# Patient Record
Sex: MALE | Race: WHITE | NOT HISPANIC OR LATINO | Employment: FULL TIME | URBAN - NONMETROPOLITAN AREA
[De-identification: names, ages, dates, MRNs, and addresses within clinical notes are randomized per-mention and may not be internally consistent; named-entity substitution may affect disease eponyms.]

---

## 2020-10-07 ENCOUNTER — HOSPITAL ENCOUNTER (EMERGENCY)
Facility: HOSPITAL | Age: 40
Discharge: HOME | End: 2020-10-07
Attending: EMERGENCY MEDICINE
Payer: MEDICAID

## 2020-10-07 VITALS
WEIGHT: 195.63 LBS | SYSTOLIC BLOOD PRESSURE: 149 MMHG | HEART RATE: 91 BPM | OXYGEN SATURATION: 99 % | HEIGHT: 72 IN | DIASTOLIC BLOOD PRESSURE: 73 MMHG | RESPIRATION RATE: 16 BRPM | TEMPERATURE: 99 F | BODY MASS INDEX: 26.5 KG/M2

## 2020-10-07 DIAGNOSIS — R07.9 CHEST PAIN: ICD-10-CM

## 2020-10-07 LAB
ALBUMIN SERPL BCP-MCNC: 3.2 G/DL (ref 3.5–5.2)
ALP SERPL-CCNC: 36 U/L (ref 55–135)
ALT SERPL W/O P-5'-P-CCNC: 258 U/L (ref 10–44)
ANION GAP SERPL CALC-SCNC: 4 MMOL/L (ref 8–16)
AST SERPL-CCNC: 73 U/L (ref 10–40)
BASOPHILS # BLD AUTO: 0.11 K/UL (ref 0–0.2)
BASOPHILS NFR BLD: 1.4 % (ref 0–1.9)
BILIRUB SERPL-MCNC: 0.2 MG/DL (ref 0.1–1)
BUN SERPL-MCNC: 14 MG/DL (ref 6–20)
CALCIUM SERPL-MCNC: 8.6 MG/DL (ref 8.7–10.5)
CHLORIDE SERPL-SCNC: 105 MMOL/L (ref 95–110)
CO2 SERPL-SCNC: 29 MMOL/L (ref 23–29)
CREAT SERPL-MCNC: 1.1 MG/DL (ref 0.5–1.4)
DIFFERENTIAL METHOD: ABNORMAL
EOSINOPHIL # BLD AUTO: 0.5 K/UL (ref 0–0.5)
EOSINOPHIL NFR BLD: 6.7 % (ref 0–8)
ERYTHROCYTE [DISTWIDTH] IN BLOOD BY AUTOMATED COUNT: 13.8 % (ref 11.5–14.5)
EST. GFR  (AFRICAN AMERICAN): >60 ML/MIN/1.73 M^2
EST. GFR  (NON AFRICAN AMERICAN): >60 ML/MIN/1.73 M^2
GLUCOSE SERPL-MCNC: 110 MG/DL (ref 70–110)
HCT VFR BLD AUTO: 41.9 % (ref 40–54)
HGB BLD-MCNC: 14.1 G/DL (ref 14–18)
IMM GRANULOCYTES # BLD AUTO: 0.03 K/UL (ref 0–0.04)
IMM GRANULOCYTES NFR BLD AUTO: 0.4 % (ref 0–0.5)
LYMPHOCYTES # BLD AUTO: 1.6 K/UL (ref 1–4.8)
LYMPHOCYTES NFR BLD: 20.3 % (ref 18–48)
MCH RBC QN AUTO: 30.7 PG (ref 27–31)
MCHC RBC AUTO-ENTMCNC: 33.7 G/DL (ref 32–36)
MCV RBC AUTO: 91 FL (ref 82–98)
MONOCYTES # BLD AUTO: 0.8 K/UL (ref 0.3–1)
MONOCYTES NFR BLD: 10.6 % (ref 4–15)
NEUTROPHILS # BLD AUTO: 4.8 K/UL (ref 1.8–7.7)
NEUTROPHILS NFR BLD: 60.6 % (ref 38–73)
NRBC BLD-RTO: 0 /100 WBC
PLATELET # BLD AUTO: 294 K/UL (ref 150–350)
PMV BLD AUTO: 11.1 FL (ref 9.2–12.9)
POTASSIUM SERPL-SCNC: 3.8 MMOL/L (ref 3.5–5.1)
PROT SERPL-MCNC: 6.7 G/DL (ref 6–8.4)
RBC # BLD AUTO: 4.59 M/UL (ref 4.6–6.2)
SODIUM SERPL-SCNC: 138 MMOL/L (ref 136–145)
TROPONIN I SERPL DL<=0.01 NG/ML-MCNC: <0.02 NG/ML (ref 0–0.03)
WBC # BLD AUTO: 7.93 K/UL (ref 3.9–12.7)

## 2020-10-07 PROCEDURE — 84484 ASSAY OF TROPONIN QUANT: CPT

## 2020-10-07 PROCEDURE — 99285 EMERGENCY DEPT VISIT HI MDM: CPT | Mod: 25

## 2020-10-07 PROCEDURE — 93010 EKG 12-LEAD: ICD-10-PCS | Mod: ,,, | Performed by: INTERNAL MEDICINE

## 2020-10-07 PROCEDURE — 93010 ELECTROCARDIOGRAM REPORT: CPT | Mod: ,,, | Performed by: INTERNAL MEDICINE

## 2020-10-07 PROCEDURE — 36415 COLL VENOUS BLD VENIPUNCTURE: CPT

## 2020-10-07 PROCEDURE — 93005 ELECTROCARDIOGRAM TRACING: CPT

## 2020-10-07 PROCEDURE — 80053 COMPREHEN METABOLIC PANEL: CPT

## 2020-10-07 PROCEDURE — 85025 COMPLETE CBC W/AUTO DIFF WBC: CPT

## 2020-10-07 RX ORDER — ALPRAZOLAM 0.5 MG/1
0.5 TABLET ORAL NIGHTLY PRN
COMMUNITY

## 2020-10-07 RX ORDER — TESTOSTERONE ENANTHATE 200 MG/ML
VIAL (ML) INTRAMUSCULAR
COMMUNITY

## 2020-10-08 NOTE — ED PROVIDER NOTES
"Encounter Date: 10/7/2020       History     Chief Complaint   Patient presents with    Palpitations     "I feel like my heart is sore. It hurts with every beat"     41 yo male with history of PTSD/anxiety here with complaints of left chest pain, decreased exercise tolerance. Gradual onset x 4 months. Worse with exertion and improved with rest. No fever. No known sick contacts.         Review of patient's allergies indicates:  No Known Allergies  History reviewed. No pertinent past medical history.  History reviewed. No pertinent surgical history.  History reviewed. No pertinent family history.  Social History     Tobacco Use    Smoking status: Never Smoker   Substance Use Topics    Alcohol use: Not on file    Drug use: Yes     Types: Marijuana     Review of Systems   Constitutional: Negative.    Respiratory: Negative.    Cardiovascular: Positive for chest pain.   All other systems reviewed and are negative.      Physical Exam     Initial Vitals [10/07/20 2111]   BP Pulse Resp Temp SpO2   (!) 149/73 96 16 98.9 °F (37.2 °C) 97 %      MAP       --         Physical Exam    Nursing note and vitals reviewed.  Constitutional: He appears well-developed and well-nourished. He is not diaphoretic. No distress.   HENT:   Head: Normocephalic and atraumatic.   Eyes: EOM are normal. Pupils are equal, round, and reactive to light.   Neck: Normal range of motion. Neck supple.   Cardiovascular: Normal rate, regular rhythm, normal heart sounds and intact distal pulses.   Pulmonary/Chest: Breath sounds normal. No respiratory distress. He has no wheezes. He has no rales.   Abdominal: Soft. Bowel sounds are normal. He exhibits no distension. There is no abdominal tenderness. There is no rebound.   Musculoskeletal: Normal range of motion. No tenderness or edema.   Neurological: He is alert and oriented to person, place, and time.   Skin: Skin is warm and dry. Capillary refill takes less than 2 seconds.   Psychiatric: He has a normal " mood and affect. Thought content normal.         ED Course   Procedures  Labs Reviewed   CBC W/ AUTO DIFFERENTIAL - Abnormal; Notable for the following components:       Result Value    RBC 4.59 (*)     All other components within normal limits   COMPREHENSIVE METABOLIC PANEL - Abnormal; Notable for the following components:    Calcium 8.6 (*)     Albumin 3.2 (*)     Alkaline Phosphatase 36 (*)     AST 73 (*)      (*)     Anion Gap 4 (*)     All other components within normal limits   TROPONIN I     EKG Readings: (Independently Interpreted)   Initial Reading: No STEMI. Rhythm: Normal Sinus Rhythm. Heart Rate: 83. Ectopy: No Ectopy. Clinical Impression: Normal Sinus Rhythm       Imaging Results          X-Ray Chest AP Portable (In process)               X-Rays:   Independently Interpreted Readings:   Chest X-Ray: No acute abnormalities.     Medical Decision Making:   Clinical Tests:   Lab Tests: Reviewed and Ordered  Radiological Study: Reviewed and Ordered  Medical Tests: Reviewed and Ordered    Additional MDM:   Heart Score:    History:          Slightly suspicious.  ECG:             Normal  Age:               Less than 45 years  Risk factors: no risk factors known  Troponin:       Less than or equal to normal limit  Final Score: 0                              Clinical Impression:       ICD-10-CM ICD-9-CM   1. Chest pain  R07.9 786.50   2. Chest pain  R07.9 786.50                      Disposition:   Disposition: Discharged  Condition: Stable     ED Disposition Condition    Discharge Stable        ED Prescriptions     None        Follow-up Information     Follow up With Specialties Details Why Contact Info    Oli Hendrickson MD Internal Medicine Schedule an appointment as soon as possible for a visit   [address] [place] [address]  [phone]      Alexys Barreto MD Cardiology Schedule an appointment as soon as possible for a visit   [address] [place] [address]  [facility] [address]  [phone]                                         Jarvis Talavera MD  10/07/20 2210

## 2020-10-08 NOTE — ED NOTES
"NEUROLOGICAL:   Patient is awake , alert  and oriented x 4 . Pupils are PERRL. Gait is steady.   Moves all extremities without difficulty.   Patient reports no neuro complaints..  GCS 15    HEENT:   Head appears normocephalic  and symmetric .   Eyes appear WNL to both eyes. Patient reports no complaints  to both eyes .   Ears appear WNL. Patient reports no complaints  to both ears.   Nares appear patent . Patient reports no nose complaints .  Mouth appears moist, pink and teeth intact. Patient reports no mouth complaints.   Throat appears pink and moist . Patient reports no throat complaints.    CARDIOVASCULAR:   S1 and S2 present, no murmurs, gallops, or rubs, rate regular  and pulses palpable (2+)    On palpation no edema noted , noted to none.   Patient reports "I feel like my heart is sore." Pt rates pain 5-6/10 and reports no radiation.  Patient vitals are WNL.    RESPIRATORY:   Airway Clear, Open, and Patent.  Respirations are even and unlabored.   Breath sounds clear  to all lung fields.   Patient reports no respiratory complaints.     GASTROINTESTINAL:   Abdomen is soft  and non-tender x 4 quadrants. Bowel sounds are normoactive to all quadrants .   Patient reports no GI complaints .     GENITOURINARY:   Patient reports no  complaints.     MUSCULOSKELETAL:   full range of motion to all extremities, no swelling noted , no tenderness noted and no weakness noted.   Patient reports no musculoskeletal complaints     SKIN:   Skin appears warm , dry , good turgor, color normal for race and intact. Patient reports no skin complaints.     "

## 2021-04-01 ENCOUNTER — LAB VISIT (OUTPATIENT)
Dept: LAB | Facility: HOSPITAL | Age: 41
End: 2021-04-01
Attending: INTERNAL MEDICINE
Payer: MEDICAID

## 2021-04-01 DIAGNOSIS — Z13.9 SCREENING FOR UNSPECIFIED CONDITION: Primary | ICD-10-CM

## 2021-04-01 DIAGNOSIS — F41.1 GENERALIZED ANXIETY DISORDER: ICD-10-CM

## 2021-04-01 DIAGNOSIS — F52.21 ERECTILE DYSFUNCTION OF NONORGANIC ORIGIN: ICD-10-CM

## 2021-04-01 LAB
ALBUMIN SERPL BCP-MCNC: 3.8 G/DL (ref 3.5–5.2)
ALP SERPL-CCNC: 48 U/L (ref 55–135)
ALT SERPL W/O P-5'-P-CCNC: 57 U/L (ref 10–44)
AMPHET+METHAMPHET UR QL: NEGATIVE
ANION GAP SERPL CALC-SCNC: 5 MMOL/L (ref 8–16)
AST SERPL-CCNC: 29 U/L (ref 10–40)
BARBITURATES UR QL SCN>200 NG/ML: NORMAL
BASOPHILS # BLD AUTO: 0.11 K/UL (ref 0–0.2)
BASOPHILS NFR BLD: 1.2 % (ref 0–1.9)
BENZODIAZ UR QL SCN>200 NG/ML: NEGATIVE
BILIRUB SERPL-MCNC: 0.2 MG/DL (ref 0.1–1)
BUN SERPL-MCNC: 12 MG/DL (ref 6–20)
BZE UR QL SCN: NEGATIVE
CALCIUM SERPL-MCNC: 8.7 MG/DL (ref 8.7–10.5)
CANNABINOIDS UR QL SCN: NEGATIVE
CHLORIDE SERPL-SCNC: 106 MMOL/L (ref 95–110)
CO2 SERPL-SCNC: 29 MMOL/L (ref 23–29)
COMPLEXED PSA SERPL-MCNC: 0.59 NG/ML (ref 0–4)
CREAT SERPL-MCNC: 1.1 MG/DL (ref 0.5–1.4)
CREAT UR-MCNC: 98.6 MG/DL (ref 23–375)
DIFFERENTIAL METHOD: ABNORMAL
EOSINOPHIL # BLD AUTO: 0.9 K/UL (ref 0–0.5)
EOSINOPHIL NFR BLD: 10 % (ref 0–8)
ERYTHROCYTE [DISTWIDTH] IN BLOOD BY AUTOMATED COUNT: 13.3 % (ref 11.5–14.5)
EST. GFR  (AFRICAN AMERICAN): >60 ML/MIN/1.73 M^2
EST. GFR  (NON AFRICAN AMERICAN): >60 ML/MIN/1.73 M^2
GLUCOSE SERPL-MCNC: 131 MG/DL (ref 70–110)
HCT VFR BLD AUTO: 45.8 % (ref 40–54)
HGB BLD-MCNC: 15.2 G/DL (ref 14–18)
IMM GRANULOCYTES # BLD AUTO: 0.03 K/UL (ref 0–0.04)
IMM GRANULOCYTES NFR BLD AUTO: 0.3 % (ref 0–0.5)
LYMPHOCYTES # BLD AUTO: 1.6 K/UL (ref 1–4.8)
LYMPHOCYTES NFR BLD: 17.8 % (ref 18–48)
MCH RBC QN AUTO: 30.3 PG (ref 27–31)
MCHC RBC AUTO-ENTMCNC: 33.2 G/DL (ref 32–36)
MCV RBC AUTO: 91 FL (ref 82–98)
METHADONE UR QL SCN>300 NG/ML: NEGATIVE
MONOCYTES # BLD AUTO: 0.6 K/UL (ref 0.3–1)
MONOCYTES NFR BLD: 7 % (ref 4–15)
NEUTROPHILS # BLD AUTO: 5.8 K/UL (ref 1.8–7.7)
NEUTROPHILS NFR BLD: 63.7 % (ref 38–73)
NRBC BLD-RTO: 0 /100 WBC
OPIATES UR QL SCN: NEGATIVE
PCP UR QL SCN>25 NG/ML: NEGATIVE
PLATELET # BLD AUTO: 325 K/UL (ref 150–450)
PMV BLD AUTO: 10.8 FL (ref 9.2–12.9)
POTASSIUM SERPL-SCNC: 3.7 MMOL/L (ref 3.5–5.1)
PROT SERPL-MCNC: 7.5 G/DL (ref 6–8.4)
RBC # BLD AUTO: 5.01 M/UL (ref 4.6–6.2)
SODIUM SERPL-SCNC: 140 MMOL/L (ref 136–145)
TOXICOLOGY INFORMATION: NORMAL
TSH SERPL DL<=0.005 MIU/L-ACNC: 2.07 UIU/ML (ref 0.4–4)
WBC # BLD AUTO: 9.12 K/UL (ref 3.9–12.7)

## 2021-04-01 PROCEDURE — 85025 COMPLETE CBC W/AUTO DIFF WBC: CPT | Performed by: INTERNAL MEDICINE

## 2021-04-01 PROCEDURE — 84402 ASSAY OF FREE TESTOSTERONE: CPT | Performed by: INTERNAL MEDICINE

## 2021-04-01 PROCEDURE — 84153 ASSAY OF PSA TOTAL: CPT | Performed by: INTERNAL MEDICINE

## 2021-04-01 PROCEDURE — 86769 SARS-COV-2 COVID-19 ANTIBODY: CPT | Performed by: INTERNAL MEDICINE

## 2021-04-01 PROCEDURE — 84443 ASSAY THYROID STIM HORMONE: CPT | Performed by: INTERNAL MEDICINE

## 2021-04-01 PROCEDURE — 80307 DRUG TEST PRSMV CHEM ANLYZR: CPT | Performed by: INTERNAL MEDICINE

## 2021-04-01 PROCEDURE — 80053 COMPREHEN METABOLIC PANEL: CPT | Performed by: INTERNAL MEDICINE

## 2021-04-01 PROCEDURE — 36415 COLL VENOUS BLD VENIPUNCTURE: CPT | Performed by: INTERNAL MEDICINE

## 2021-04-02 LAB — SARS-COV-2 IGG SERPLBLD QL IA.RAPID: NEGATIVE

## 2021-04-06 LAB — TESTOST FREE SERPL-MCNC: 25.1 PG/ML (ref 5.1–41.5)

## 2021-05-22 ENCOUNTER — HOSPITAL ENCOUNTER (EMERGENCY)
Facility: HOSPITAL | Age: 41
Discharge: HOME | End: 2021-05-22
Attending: EMERGENCY MEDICINE
Payer: MEDICAID

## 2021-05-22 VITALS
HEART RATE: 97 BPM | SYSTOLIC BLOOD PRESSURE: 137 MMHG | WEIGHT: 187 LBS | DIASTOLIC BLOOD PRESSURE: 94 MMHG | TEMPERATURE: 99 F | HEIGHT: 72 IN | OXYGEN SATURATION: 97 % | BODY MASS INDEX: 25.33 KG/M2 | RESPIRATION RATE: 16 BRPM

## 2021-05-22 DIAGNOSIS — K12.2 UVULITIS: Primary | ICD-10-CM

## 2021-05-22 LAB
ALBUMIN SERPL BCP-MCNC: 3.3 G/DL (ref 3.5–5.2)
ALP SERPL-CCNC: 42 U/L (ref 55–135)
ALT SERPL W/O P-5'-P-CCNC: 54 U/L (ref 10–44)
ANION GAP SERPL CALC-SCNC: 5 MMOL/L (ref 8–16)
AST SERPL-CCNC: 24 U/L (ref 10–40)
BASOPHILS # BLD AUTO: 0.15 K/UL (ref 0–0.2)
BASOPHILS NFR BLD: 1.9 % (ref 0–1.9)
BILIRUB SERPL-MCNC: 0.3 MG/DL (ref 0.1–1)
BUN SERPL-MCNC: 12 MG/DL (ref 6–20)
CALCIUM SERPL-MCNC: 9.2 MG/DL (ref 8.7–10.5)
CHLORIDE SERPL-SCNC: 110 MMOL/L (ref 95–110)
CO2 SERPL-SCNC: 28 MMOL/L (ref 23–29)
CREAT SERPL-MCNC: 1.1 MG/DL (ref 0.5–1.4)
CTP QC/QA: YES
DIFFERENTIAL METHOD: ABNORMAL
EOSINOPHIL # BLD AUTO: 0.8 K/UL (ref 0–0.5)
EOSINOPHIL NFR BLD: 9.6 % (ref 0–8)
ERYTHROCYTE [DISTWIDTH] IN BLOOD BY AUTOMATED COUNT: 13.5 % (ref 11.5–14.5)
EST. GFR  (AFRICAN AMERICAN): >60 ML/MIN/1.73 M^2
EST. GFR  (NON AFRICAN AMERICAN): >60 ML/MIN/1.73 M^2
GLUCOSE SERPL-MCNC: 99 MG/DL (ref 70–110)
HCT VFR BLD AUTO: 45.2 % (ref 40–54)
HGB BLD-MCNC: 15 G/DL (ref 14–18)
IMM GRANULOCYTES # BLD AUTO: 0.04 K/UL (ref 0–0.04)
IMM GRANULOCYTES NFR BLD AUTO: 0.5 % (ref 0–0.5)
LYMPHOCYTES # BLD AUTO: 1.1 K/UL (ref 1–4.8)
LYMPHOCYTES NFR BLD: 14 % (ref 18–48)
MCH RBC QN AUTO: 30.3 PG (ref 27–31)
MCHC RBC AUTO-ENTMCNC: 33.2 G/DL (ref 32–36)
MCV RBC AUTO: 91 FL (ref 82–98)
MONOCYTES # BLD AUTO: 0.8 K/UL (ref 0.3–1)
MONOCYTES NFR BLD: 9.9 % (ref 4–15)
NEUTROPHILS # BLD AUTO: 5.2 K/UL (ref 1.8–7.7)
NEUTROPHILS NFR BLD: 64.1 % (ref 38–73)
NRBC BLD-RTO: 0 /100 WBC
PLATELET # BLD AUTO: 285 K/UL (ref 150–450)
PMV BLD AUTO: 11.3 FL (ref 9.2–12.9)
POTASSIUM SERPL-SCNC: 4.3 MMOL/L (ref 3.5–5.1)
PROT SERPL-MCNC: 6.6 G/DL (ref 6–8.4)
RBC # BLD AUTO: 4.95 M/UL (ref 4.6–6.2)
SARS-COV-2 RDRP RESP QL NAA+PROBE: NEGATIVE
SODIUM SERPL-SCNC: 143 MMOL/L (ref 136–145)
WBC # BLD AUTO: 8.02 K/UL (ref 3.9–12.7)

## 2021-05-22 PROCEDURE — 25000003 PHARM REV CODE 250: Performed by: NURSE PRACTITIONER

## 2021-05-22 PROCEDURE — 36415 COLL VENOUS BLD VENIPUNCTURE: CPT | Performed by: NURSE PRACTITIONER

## 2021-05-22 PROCEDURE — 96372 THER/PROPH/DIAG INJ SC/IM: CPT

## 2021-05-22 PROCEDURE — 63600175 PHARM REV CODE 636 W HCPCS: Performed by: NURSE PRACTITIONER

## 2021-05-22 PROCEDURE — 99284 EMERGENCY DEPT VISIT MOD MDM: CPT | Mod: 25

## 2021-05-22 PROCEDURE — 85025 COMPLETE CBC W/AUTO DIFF WBC: CPT | Performed by: NURSE PRACTITIONER

## 2021-05-22 PROCEDURE — U0002 COVID-19 LAB TEST NON-CDC: HCPCS | Performed by: NURSE PRACTITIONER

## 2021-05-22 PROCEDURE — 80053 COMPREHEN METABOLIC PANEL: CPT | Performed by: NURSE PRACTITIONER

## 2021-05-22 RX ORDER — DEXAMETHASONE SODIUM PHOSPHATE 4 MG/ML
8 INJECTION, SOLUTION INTRA-ARTICULAR; INTRALESIONAL; INTRAMUSCULAR; INTRAVENOUS; SOFT TISSUE
Status: COMPLETED | OUTPATIENT
Start: 2021-05-22 | End: 2021-05-22

## 2021-05-22 RX ORDER — AMOXICILLIN AND CLAVULANATE POTASSIUM 875; 125 MG/1; MG/1
1 TABLET, FILM COATED ORAL 2 TIMES DAILY
Qty: 14 TABLET | Refills: 0 | Status: SHIPPED | OUTPATIENT
Start: 2021-05-22 | End: 2022-01-04 | Stop reason: ALTCHOICE

## 2021-05-22 RX ORDER — ONDANSETRON 4 MG/1
4 TABLET, ORALLY DISINTEGRATING ORAL
Status: COMPLETED | OUTPATIENT
Start: 2021-05-22 | End: 2021-05-22

## 2021-05-22 RX ADMIN — ONDANSETRON 4 MG: 4 TABLET, ORALLY DISINTEGRATING ORAL at 12:05

## 2021-05-22 RX ADMIN — DEXAMETHASONE SODIUM PHOSPHATE 8 MG: 4 INJECTION, SOLUTION INTRA-ARTICULAR; INTRALESIONAL; INTRAMUSCULAR; INTRAVENOUS; SOFT TISSUE at 01:05

## 2021-05-25 ENCOUNTER — HOSPITAL ENCOUNTER (EMERGENCY)
Facility: HOSPITAL | Age: 41
Discharge: HOME | End: 2021-05-25
Attending: EMERGENCY MEDICINE
Payer: MEDICAID

## 2021-05-25 VITALS
HEART RATE: 93 BPM | BODY MASS INDEX: 26.88 KG/M2 | TEMPERATURE: 99 F | HEIGHT: 71 IN | WEIGHT: 192 LBS | OXYGEN SATURATION: 97 % | DIASTOLIC BLOOD PRESSURE: 85 MMHG | RESPIRATION RATE: 16 BRPM | SYSTOLIC BLOOD PRESSURE: 137 MMHG

## 2021-05-25 DIAGNOSIS — Z20.822 COVID-19 VIRUS NOT DETECTED: Primary | ICD-10-CM

## 2021-05-25 LAB
CTP QC/QA: YES
SARS-COV-2 RDRP RESP QL NAA+PROBE: NEGATIVE

## 2021-05-25 PROCEDURE — 99282 EMERGENCY DEPT VISIT SF MDM: CPT

## 2021-05-25 PROCEDURE — U0002 COVID-19 LAB TEST NON-CDC: HCPCS | Performed by: EMERGENCY MEDICINE

## 2021-07-07 ENCOUNTER — HOSPITAL ENCOUNTER (EMERGENCY)
Facility: HOSPITAL | Age: 41
Discharge: HOME | End: 2021-07-07
Attending: EMERGENCY MEDICINE
Payer: MEDICAID

## 2021-07-07 VITALS
OXYGEN SATURATION: 99 % | HEART RATE: 108 BPM | DIASTOLIC BLOOD PRESSURE: 67 MMHG | HEIGHT: 71 IN | RESPIRATION RATE: 20 BRPM | WEIGHT: 186 LBS | TEMPERATURE: 99 F | SYSTOLIC BLOOD PRESSURE: 155 MMHG | BODY MASS INDEX: 26.04 KG/M2

## 2021-07-07 DIAGNOSIS — R06.00 DYSPNEA, UNSPECIFIED TYPE: Primary | ICD-10-CM

## 2021-07-07 DIAGNOSIS — F41.9 ANXIETY: ICD-10-CM

## 2021-07-07 DIAGNOSIS — R06.02 SOB (SHORTNESS OF BREATH): ICD-10-CM

## 2021-07-07 PROCEDURE — 99283 EMERGENCY DEPT VISIT LOW MDM: CPT | Mod: 25

## 2021-07-07 RX ORDER — TESTOSTERONE CYPIONATE 200 MG/ML
INJECTION, SOLUTION INTRAMUSCULAR
COMMUNITY
Start: 2021-03-21

## 2021-07-07 RX ORDER — BUPROPION HYDROCHLORIDE 300 MG/1
TABLET ORAL
COMMUNITY
Start: 2021-03-21

## 2021-08-10 ENCOUNTER — HOSPITAL ENCOUNTER (EMERGENCY)
Facility: HOSPITAL | Age: 41
Discharge: HOME | End: 2021-08-11
Attending: EMERGENCY MEDICINE
Payer: MEDICAID

## 2021-08-10 VITALS
BODY MASS INDEX: 26.6 KG/M2 | RESPIRATION RATE: 20 BRPM | TEMPERATURE: 98 F | SYSTOLIC BLOOD PRESSURE: 131 MMHG | WEIGHT: 190 LBS | HEIGHT: 71 IN | DIASTOLIC BLOOD PRESSURE: 90 MMHG | OXYGEN SATURATION: 98 % | HEART RATE: 103 BPM

## 2021-08-10 DIAGNOSIS — R51.9 ACUTE NONINTRACTABLE HEADACHE, UNSPECIFIED HEADACHE TYPE: Primary | ICD-10-CM

## 2021-08-10 LAB
CTP QC/QA: YES
SARS-COV-2 RDRP RESP QL NAA+PROBE: NEGATIVE

## 2021-08-10 PROCEDURE — 99282 EMERGENCY DEPT VISIT SF MDM: CPT

## 2021-08-10 PROCEDURE — U0002 COVID-19 LAB TEST NON-CDC: HCPCS | Performed by: NURSE PRACTITIONER

## 2021-08-18 ENCOUNTER — HOSPITAL ENCOUNTER (OUTPATIENT)
Dept: RADIOLOGY | Facility: HOSPITAL | Age: 41
Discharge: HOME | End: 2021-08-18
Attending: INTERNAL MEDICINE
Payer: MEDICAID

## 2021-08-18 DIAGNOSIS — J06.9 ACUTE UPPER RESPIRATORY INFECTION: ICD-10-CM

## 2021-08-18 DIAGNOSIS — J06.9 ACUTE UPPER RESPIRATORY INFECTION: Primary | ICD-10-CM

## 2021-08-18 PROCEDURE — 71046 X-RAY EXAM CHEST 2 VIEWS: CPT | Mod: TC

## 2021-10-21 ENCOUNTER — HOSPITAL ENCOUNTER (EMERGENCY)
Facility: HOSPITAL | Age: 41
Discharge: HOME | End: 2021-10-21
Attending: EMERGENCY MEDICINE
Payer: MEDICAID

## 2021-10-21 VITALS
HEART RATE: 110 BPM | RESPIRATION RATE: 18 BRPM | DIASTOLIC BLOOD PRESSURE: 78 MMHG | WEIGHT: 183 LBS | OXYGEN SATURATION: 96 % | TEMPERATURE: 98 F | BODY MASS INDEX: 25.52 KG/M2 | SYSTOLIC BLOOD PRESSURE: 128 MMHG

## 2021-10-21 DIAGNOSIS — R00.2 PALPITATION: ICD-10-CM

## 2021-10-21 DIAGNOSIS — F41.9 ANXIETY: Primary | ICD-10-CM

## 2021-10-21 PROCEDURE — 93010 EKG 12-LEAD: ICD-10-PCS | Mod: ,,, | Performed by: INTERNAL MEDICINE

## 2021-10-21 PROCEDURE — 93005 ELECTROCARDIOGRAM TRACING: CPT

## 2021-10-21 PROCEDURE — 25000003 PHARM REV CODE 250: Performed by: EMERGENCY MEDICINE

## 2021-10-21 PROCEDURE — 93010 ELECTROCARDIOGRAM REPORT: CPT | Mod: ,,, | Performed by: INTERNAL MEDICINE

## 2021-10-21 PROCEDURE — 99283 EMERGENCY DEPT VISIT LOW MDM: CPT | Mod: 25

## 2021-10-21 RX ORDER — METOPROLOL TARTRATE 25 MG/1
25 TABLET, FILM COATED ORAL
Status: COMPLETED | OUTPATIENT
Start: 2021-10-21 | End: 2021-10-21

## 2021-10-21 RX ADMIN — METOPROLOL TARTRATE 25 MG: 25 TABLET, FILM COATED ORAL at 07:10

## 2021-10-28 ENCOUNTER — HOSPITAL ENCOUNTER (EMERGENCY)
Facility: HOSPITAL | Age: 41
Discharge: HOME | End: 2021-10-28
Attending: EMERGENCY MEDICINE
Payer: MEDICAID

## 2021-10-28 VITALS
SYSTOLIC BLOOD PRESSURE: 128 MMHG | TEMPERATURE: 98 F | WEIGHT: 183 LBS | BODY MASS INDEX: 25.62 KG/M2 | HEART RATE: 90 BPM | HEIGHT: 71 IN | DIASTOLIC BLOOD PRESSURE: 78 MMHG | OXYGEN SATURATION: 98 % | RESPIRATION RATE: 16 BRPM

## 2021-10-28 DIAGNOSIS — R10.9 ABDOMINAL PAIN, UNSPECIFIED ABDOMINAL LOCATION: Primary | ICD-10-CM

## 2021-10-28 LAB
ALBUMIN SERPL BCP-MCNC: 3.1 G/DL (ref 3.5–5.2)
ALP SERPL-CCNC: 50 U/L (ref 55–135)
ALT SERPL W/O P-5'-P-CCNC: 82 U/L (ref 10–44)
ANION GAP SERPL CALC-SCNC: 5 MMOL/L (ref 8–16)
AST SERPL-CCNC: 33 U/L (ref 10–40)
BASOPHILS # BLD AUTO: 0.12 K/UL (ref 0–0.2)
BASOPHILS NFR BLD: 1.3 % (ref 0–1.9)
BILIRUB SERPL-MCNC: 0.2 MG/DL (ref 0.1–1)
BILIRUB UR QL STRIP: NEGATIVE
BUN SERPL-MCNC: 12 MG/DL (ref 6–20)
CALCIUM SERPL-MCNC: 8.1 MG/DL (ref 8.7–10.5)
CHLORIDE SERPL-SCNC: 108 MMOL/L (ref 95–110)
CLARITY UR: CLEAR
CO2 SERPL-SCNC: 26 MMOL/L (ref 23–29)
COLOR UR: YELLOW
CREAT SERPL-MCNC: 1.2 MG/DL (ref 0.5–1.4)
DIFFERENTIAL METHOD: ABNORMAL
EOSINOPHIL # BLD AUTO: 0.5 K/UL (ref 0–0.5)
EOSINOPHIL NFR BLD: 5.1 % (ref 0–8)
ERYTHROCYTE [DISTWIDTH] IN BLOOD BY AUTOMATED COUNT: 13.4 % (ref 11.5–14.5)
EST. GFR  (AFRICAN AMERICAN): >60 ML/MIN/1.73 M^2
EST. GFR  (NON AFRICAN AMERICAN): >60 ML/MIN/1.73 M^2
GLUCOSE SERPL-MCNC: 125 MG/DL (ref 70–110)
GLUCOSE UR QL STRIP: NEGATIVE
HCT VFR BLD AUTO: 46.1 % (ref 40–54)
HGB BLD-MCNC: 15.5 G/DL (ref 14–18)
HGB UR QL STRIP: NEGATIVE
IMM GRANULOCYTES # BLD AUTO: 0.06 K/UL (ref 0–0.04)
IMM GRANULOCYTES NFR BLD AUTO: 0.6 % (ref 0–0.5)
KETONES UR QL STRIP: ABNORMAL
LEUKOCYTE ESTERASE UR QL STRIP: NEGATIVE
LIPASE SERPL-CCNC: 73 U/L (ref 23–300)
LYMPHOCYTES # BLD AUTO: 1.2 K/UL (ref 1–4.8)
LYMPHOCYTES NFR BLD: 13.1 % (ref 18–48)
MCH RBC QN AUTO: 30.5 PG (ref 27–31)
MCHC RBC AUTO-ENTMCNC: 33.6 G/DL (ref 32–36)
MCV RBC AUTO: 91 FL (ref 82–98)
MONOCYTES # BLD AUTO: 0.7 K/UL (ref 0.3–1)
MONOCYTES NFR BLD: 7 % (ref 4–15)
NEUTROPHILS # BLD AUTO: 6.9 K/UL (ref 1.8–7.7)
NEUTROPHILS NFR BLD: 72.9 % (ref 38–73)
NITRITE UR QL STRIP: NEGATIVE
NRBC BLD-RTO: 0 /100 WBC
PH UR STRIP: 6 [PH] (ref 5–8)
PLATELET # BLD AUTO: 346 K/UL (ref 150–450)
PMV BLD AUTO: 11 FL (ref 9.2–12.9)
POTASSIUM SERPL-SCNC: 4.1 MMOL/L (ref 3.5–5.1)
PROT SERPL-MCNC: 6.7 G/DL (ref 6–8.4)
PROT UR QL STRIP: NEGATIVE
RBC # BLD AUTO: 5.08 M/UL (ref 4.6–6.2)
SODIUM SERPL-SCNC: 139 MMOL/L (ref 136–145)
SP GR UR STRIP: 1.02 (ref 1–1.03)
URN SPEC COLLECT METH UR: ABNORMAL
UROBILINOGEN UR STRIP-ACNC: 1 EU/DL
WBC # BLD AUTO: 9.4 K/UL (ref 3.9–12.7)

## 2021-10-28 PROCEDURE — 80053 COMPREHEN METABOLIC PANEL: CPT | Performed by: CLINICAL NURSE SPECIALIST

## 2021-10-28 PROCEDURE — 99283 EMERGENCY DEPT VISIT LOW MDM: CPT

## 2021-10-28 PROCEDURE — 85025 COMPLETE CBC W/AUTO DIFF WBC: CPT | Performed by: CLINICAL NURSE SPECIALIST

## 2021-10-28 PROCEDURE — 81003 URINALYSIS AUTO W/O SCOPE: CPT | Performed by: CLINICAL NURSE SPECIALIST

## 2021-10-28 PROCEDURE — 83690 ASSAY OF LIPASE: CPT | Performed by: CLINICAL NURSE SPECIALIST

## 2021-10-28 PROCEDURE — 36415 COLL VENOUS BLD VENIPUNCTURE: CPT | Performed by: CLINICAL NURSE SPECIALIST

## 2021-11-02 ENCOUNTER — HOSPITAL ENCOUNTER (EMERGENCY)
Facility: HOSPITAL | Age: 41
Discharge: HOME | End: 2021-11-02
Attending: EMERGENCY MEDICINE
Payer: MEDICAID

## 2021-11-02 VITALS
SYSTOLIC BLOOD PRESSURE: 126 MMHG | BODY MASS INDEX: 24.79 KG/M2 | HEART RATE: 106 BPM | WEIGHT: 183 LBS | DIASTOLIC BLOOD PRESSURE: 74 MMHG | TEMPERATURE: 98 F | HEIGHT: 72 IN | OXYGEN SATURATION: 99 % | RESPIRATION RATE: 16 BRPM

## 2021-11-02 DIAGNOSIS — T22.212A PARTIAL THICKNESS BURN OF LEFT FOREARM, INITIAL ENCOUNTER: Primary | ICD-10-CM

## 2021-11-02 PROCEDURE — 99284 EMERGENCY DEPT VISIT MOD MDM: CPT

## 2021-11-02 RX ORDER — TRAMADOL HYDROCHLORIDE 50 MG/1
50 TABLET ORAL EVERY 6 HOURS PRN
Qty: 8 TABLET | Refills: 0 | Status: SHIPPED | OUTPATIENT
Start: 2021-11-02

## 2021-11-02 RX ORDER — BACITRACIN ZINC 500 UNIT/G
OINTMENT (GRAM) TOPICAL 2 TIMES DAILY
Qty: 30 G | Refills: 0 | Status: SHIPPED | OUTPATIENT
Start: 2021-11-02

## 2021-11-04 ENCOUNTER — PATIENT OUTREACH (OUTPATIENT)
Dept: EMERGENCY MEDICINE | Facility: HOSPITAL | Age: 41
End: 2021-11-04
Payer: MEDICAID

## 2021-12-01 ENCOUNTER — HOSPITAL ENCOUNTER (EMERGENCY)
Facility: HOSPITAL | Age: 41
Discharge: HOME | End: 2021-12-01
Attending: STUDENT IN AN ORGANIZED HEALTH CARE EDUCATION/TRAINING PROGRAM
Payer: MEDICAID

## 2021-12-01 VITALS
TEMPERATURE: 98 F | HEIGHT: 69 IN | HEART RATE: 101 BPM | DIASTOLIC BLOOD PRESSURE: 98 MMHG | RESPIRATION RATE: 18 BRPM | BODY MASS INDEX: 27.11 KG/M2 | OXYGEN SATURATION: 97 % | WEIGHT: 183 LBS | SYSTOLIC BLOOD PRESSURE: 163 MMHG

## 2021-12-01 DIAGNOSIS — Z20.822 COVID-19 RULED OUT: Primary | ICD-10-CM

## 2021-12-01 LAB
CTP QC/QA: YES
SARS-COV-2 RDRP RESP QL NAA+PROBE: NEGATIVE

## 2021-12-01 PROCEDURE — 99282 EMERGENCY DEPT VISIT SF MDM: CPT

## 2021-12-01 PROCEDURE — U0002 COVID-19 LAB TEST NON-CDC: HCPCS | Performed by: CLINICAL NURSE SPECIALIST

## 2021-12-09 ENCOUNTER — HOSPITAL ENCOUNTER (OUTPATIENT)
Dept: RADIOLOGY | Facility: HOSPITAL | Age: 41
Discharge: HOME | End: 2021-12-09
Attending: INTERNAL MEDICINE
Payer: MEDICAID

## 2021-12-09 DIAGNOSIS — J06.9 ACUTE UPPER RESPIRATORY INFECTION: ICD-10-CM

## 2021-12-09 DIAGNOSIS — J06.9 ACUTE UPPER RESPIRATORY INFECTION: Primary | ICD-10-CM

## 2021-12-09 PROCEDURE — 71046 X-RAY EXAM CHEST 2 VIEWS: CPT | Mod: TC

## 2022-06-30 VITALS
WEIGHT: 185 LBS | DIASTOLIC BLOOD PRESSURE: 93 MMHG | TEMPERATURE: 99 F | BODY MASS INDEX: 25.06 KG/M2 | HEART RATE: 94 BPM | SYSTOLIC BLOOD PRESSURE: 138 MMHG | RESPIRATION RATE: 16 BRPM | HEIGHT: 72 IN

## 2022-06-30 PROCEDURE — 99283 EMERGENCY DEPT VISIT LOW MDM: CPT

## 2022-06-30 PROCEDURE — 25000003 PHARM REV CODE 250

## 2022-06-30 RX ORDER — TETRACAINE HYDROCHLORIDE 5 MG/ML
SOLUTION OPHTHALMIC
Status: COMPLETED
Start: 2022-06-30 | End: 2022-07-01

## 2022-07-01 ENCOUNTER — HOSPITAL ENCOUNTER (EMERGENCY)
Facility: HOSPITAL | Age: 42
Discharge: HOME | End: 2022-07-01
Attending: STUDENT IN AN ORGANIZED HEALTH CARE EDUCATION/TRAINING PROGRAM
Payer: MEDICAID

## 2022-07-01 DIAGNOSIS — H11.31 SUBCONJUNCTIVAL HEMORRHAGE OF RIGHT EYE: Primary | ICD-10-CM

## 2022-07-01 PROCEDURE — 25000003 PHARM REV CODE 250

## 2022-07-01 RX ADMIN — FLUORESCEIN SODIUM 1 EACH: 1 STRIP OPHTHALMIC at 12:07

## 2022-07-01 RX ADMIN — TETRACAINE HYDROCHLORIDE: 5 SOLUTION OPHTHALMIC at 12:07

## 2022-07-01 NOTE — ED PROVIDER NOTES
"Encounter Date: 6/30/2022       History     Chief Complaint   Patient presents with    Eye Injury     Working on a vehicle and pulling on a plastic piece that hit the right eye. TOA 2 days ago. Now eye "feels weird". Denies double vision.      42-year-old male with no significant past medical history presents with evaluation of "usually sensation in right eye".  Patient accidentally hit himself 2 days ago in the eye with a plastic piece at work.  Patient did not seek any care at that time.  Patient says vision has been unchanged.  Patient got concerned because redness in conjunctiva is getting larger        Review of patient's allergies indicates:  No Known Allergies  History reviewed. No pertinent past medical history.  History reviewed. No pertinent surgical history.  History reviewed. No pertinent family history.  Social History     Tobacco Use    Smoking status: Never Smoker    Smokeless tobacco: Never Used   Substance Use Topics    Drug use: Yes     Types: Marijuana     Review of Systems   Constitutional: Negative.    HENT: Negative.    Eyes: Positive for redness. Negative for photophobia, pain and visual disturbance.   Respiratory: Negative.    Cardiovascular: Negative.    Gastrointestinal: Negative.    Genitourinary: Negative.    Musculoskeletal: Negative.    Skin: Negative.    Neurological: Negative.    Psychiatric/Behavioral: Negative.    All other systems reviewed and are negative.      Physical Exam     Initial Vitals [06/30/22 2341]   BP Pulse Resp Temp SpO2   (!) 138/93 94 16 98.5 °F (36.9 °C) --      MAP       --         Physical Exam    Nursing note and vitals reviewed.  Constitutional: Vital signs are normal. He appears well-developed and well-nourished.   HENT:   Head: Normocephalic and atraumatic.   Eyes: Conjunctivae and lids are normal.   Subconjunctival hemorrhage.  No corneal abrasion.  No Tosha some.  No hyphema.  Pressure 16   Neck: Trachea normal. Neck supple.   Cardiovascular: Normal " rate, regular rhythm, normal heart sounds and normal pulses.   Pulmonary/Chest: Breath sounds normal. He has no wheezes. He has no rhonchi.   Abdominal: Abdomen is soft. Bowel sounds are normal. He exhibits no distension. There is no abdominal tenderness. There is no rebound and no guarding.   Musculoskeletal:         General: Normal range of motion.      Cervical back: Neck supple.     Neurological: He is alert and oriented to person, place, and time. He has normal strength. GCS eye subscore is 4. GCS verbal subscore is 5. GCS motor subscore is 6.   Skin: Skin is warm. Capillary refill takes less than 2 seconds.   Psychiatric: He has a normal mood and affect. His speech is normal. Thought content normal.         ED Course   Procedures  Labs Reviewed - No data to display       Imaging Results    None          Medications   fluorescein (FLUOR-I-STRIPS A.T.) 1 mg ophthalmic strip (1 each  Given 7/1/22 0037)   TETRAcaine HCl (PF) 0.5 % Drop (  Given 7/1/22 0036)     Medical Decision Making:   Initial Assessment:   Subconjunctival hemorrhage.  Provided reassurance.  Follow-up with ophthalmology                      Clinical Impression:   Final diagnoses:  [H11.31] Subconjunctival hemorrhage of right eye (Primary)          ED Disposition Condition    Discharge Stable        ED Prescriptions     None        Follow-up Information     Follow up With Specialties Details Why Contact Info    Motnez Billingsley MD Ophthalmology   [address] [facility]  PEDRO OPHTHALMOLOGY  [facility] [address]  [phone]             Matt Brooks MD  07/01/22 2702

## 2022-07-01 NOTE — Clinical Note
"Yasir Duncan" Saravanan was seen and treated in our emergency department on 6/30/2022.  He may return to work on 07/05/2022.       If you have any questions or concerns, please don't hesitate to call.      Matt Brooks MD"

## 2023-02-27 ENCOUNTER — HOSPITAL ENCOUNTER (EMERGENCY)
Facility: HOSPITAL | Age: 43
Discharge: HOME | End: 2023-02-27
Attending: EMERGENCY MEDICINE
Payer: MEDICAID

## 2023-02-27 VITALS
OXYGEN SATURATION: 100 % | BODY MASS INDEX: 24.92 KG/M2 | RESPIRATION RATE: 20 BRPM | WEIGHT: 184 LBS | SYSTOLIC BLOOD PRESSURE: 125 MMHG | TEMPERATURE: 98 F | HEART RATE: 94 BPM | DIASTOLIC BLOOD PRESSURE: 94 MMHG | HEIGHT: 72 IN

## 2023-02-27 DIAGNOSIS — S39.011A STRAIN OF ABDOMINAL WALL, INITIAL ENCOUNTER: Primary | ICD-10-CM

## 2023-02-27 PROCEDURE — 99284 EMERGENCY DEPT VISIT MOD MDM: CPT | Mod: 25

## 2023-02-27 NOTE — ED PROVIDER NOTES
"EMERGENCY DEPARTMENT HISTORY AND PHYSICAL EXAM     This note is dictated on M*Modal word recognition program.  There are word recognition mistakes and grammatical errors that are occasionally missed on review.     Date: 2/27/2023   Patient Name: Yasir Coe Jr.       History of Presenting Illness      Chief Complaint   Patient presents with    Abdominal Pain     Epigastric pain since yesterday. States he leaned on something while working on car-bump under the skin felt like it popped and now its sore."  Denies nvd.        1140   Yasir Coe Jr. is a 42 y.o. male with PMHX of anxiety, GERD, testosterone use who presents to the emergency department C/O epigastric pain.    Patient says he was leaning over a car at work yesterday and made a torso twisting motion.  He states he felt something pop in his epigastrium and had pain.  He states he continues to feel abnormal like a hard marble in his epigastrium.  He is having no GI symptoms.  No prior episodes.      PCP: Oli Hendrickson MD        No current facility-administered medications for this encounter.     Current Outpatient Medications   Medication Sig Dispense Refill    ADVAIR DISKUS 250-50 mcg/dose diskus inhaler INHALE 1 PUFF INTO THE LUNGS 2 (TWO) TIMES DAILY. CONTROLLER 60 each 1    albuterol (PROVENTIL/VENTOLIN HFA) 90 mcg/actuation inhaler       ALPRAZolam (XANAX) 0.5 MG tablet Take 0.5 mg by mouth nightly as needed for Anxiety.      bacitracin 500 unit/gram Oint Apply topically 2 (two) times daily. (Patient taking differently: Apply topically 2 (two) times daily as needed.) 30 g 0    buPROPion (WELLBUTRIN XL) 300 MG 24 hr tablet       busPIRone (BUSPAR) 15 MG tablet Take 15 mg by mouth once daily.      butalbital-acetaminophen-caffeine -40 mg (FIORICET, ESGIC) -40 mg per tablet TAKE 1 2 TABLET(S) BY MOUTH EVERY 6 HOURS AS NEEDED FOR HEADACHE. DO NOT TAKE MORE THAN 5 PER DAY      montelukast (SINGULAIR) 10 mg tablet TAKE 1 " "TABLET BY MOUTH DAILY AS NEEDED FOR SINUS OR RASH      omeprazole (PRILOSEC) 40 MG capsule Take 1 capsule (40 mg total) by mouth once daily. 30 capsule 11    testosterone cypionate (DEPOTESTOTERONE CYPIONATE) 200 mg/mL injection       testosterone enanthate (DELATESTRYL) 200 mg/mL injection Inject into the muscle every 14 (fourteen) days. Pt takes testosterone 2 x's a week      traMADoL (ULTRAM) 50 mg tablet Take 1 tablet (50 mg total) by mouth every 6 (six) hours as needed for Pain. 8 tablet 0           Past History     Past Medical History:   History reviewed. No pertinent past medical history.     Past Surgical History:   No past surgical history on file.     Family History:   No family history on file.     Social History:   Social History     Tobacco Use    Smoking status: Never    Smokeless tobacco: Never   Substance Use Topics    Drug use: Yes     Types: Marijuana        Allergies:   Review of patient's allergies indicates:  No Known Allergies       Review of Systems   Review of Systems   See HPI for pertinent positives and negatives       Physical Exam     Vitals:    02/27/23 1039 02/27/23 1041 02/27/23 1314   BP:  (!) 148/94 (!) 125/94   BP Location:   Right arm   Pulse:  92 94   Resp:  20 20   Temp:  98.2 °F (36.8 °C) 97.8 °F (36.6 °C)   TempSrc:   Oral   SpO2:  100% 100%   Weight: 83.5 kg (184 lb)     Height: (!) 6" (0.152 m) 6' (1.829 m)       Physical Exam  Vitals and nursing note reviewed.   Constitutional:       General: He is not in acute distress.     Appearance: Normal appearance. He is well-developed. He is not ill-appearing.   HENT:      Head: Normocephalic and atraumatic.   Eyes:      Extraocular Movements: Extraocular movements intact.      Conjunctiva/sclera: Conjunctivae normal.   Cardiovascular:      Rate and Rhythm: Normal rate and regular rhythm.   Pulmonary:      Effort: Pulmonary effort is normal. No respiratory distress.   Abdominal:      General: Abdomen is flat. Bowel sounds are " normal.      Palpations: Abdomen is soft.      Tenderness: There is no abdominal tenderness. There is no guarding or rebound.      Hernia: No hernia is present.   Musculoskeletal:         General: No deformity or signs of injury. Normal range of motion.      Cervical back: Normal range of motion. No rigidity.   Skin:     General: Skin is warm and dry.      Coloration: Skin is not pale.      Findings: No rash.   Neurological:      General: No focal deficit present.      Mental Status: He is alert and oriented to person, place, and time.      Cranial Nerves: No cranial nerve deficit.      Motor: No weakness.      Coordination: Coordination normal.            Diagnostic Study Results      Labs -   No results found for this or any previous visit (from the past 12 hour(s)).     Radiologic Studies -    CT Abdomen Pelvis  Without Contrast   Final Result      1. No acute inflammatory process detected within the abdomen or pelvis on this noncontrast study.   2. Right upper pole renal cyst   3. Colonic diverticulosis without evidence for diverticulitis.         Electronically signed by: Semaj Pappas MD   Date:    02/27/2023   Time:    13:42           Medications given in the ED-   Medications - No data to display        Medical Decision Making    I am the first provider for this patient.     I reviewed the vital signs, available nursing notes, past medical history, past surgical history, family history and social history.     Vital Signs:  Reviewed the patient's vital signs.     Pulse Oximetry Analysis and Interpretation:    100% on Room Air, normal    External Test Results (Pertinent to encounter):    Records Reviewed: Nursing Notes, Current Prescription Medications, Old Medical Records, and External Medical Records     History Obtained By: Patient    Provider Notes: Yasir Coe Jr. is a 42 y.o. male with epigastric pain after body motion injury    Co-morbidities Considered: none    Differential Diagnosis:  Hernia,  abdominal strain      ED Course:    Reviewed imaging.  No acute findings.  Will treat as MSK strain.    Noted incidental renal cyst on CT.  Advised patient follow-up primary care provider, avoid heavy lifting         Problems Addressed:  MSK strain    Procedures:   Procedures       Diagnosis and Disposition     Critical Care:      DISCHARGE NOTE:       Yasir Foremannctushar WalkerEda's  results have been reviewed with him.  He has been counseled regarding his diagnosis, treatment, and plan.  He verbally conveys understanding and agreement of the signs, symptoms, diagnosis, treatment and prognosis and additionally agrees to follow up as discussed.  He also agrees with the care-plan and conveys that all of his questions have been answered.  I have also provided discharge instructions for him that include: educational information regarding their diagnosis and treatment, and list of reasons why they would want to return to the ED prior to their follow-up appointment, should his condition change. He has been provided with education for proper emergency department utilization.         CLINICAL IMPRESSION:         1. Strain of abdominal wall, initial encounter              PLAN:   1. Discharge Home  2.      Medication List        ASK your doctor about these medications      ADVAIR DISKUS 250-50 mcg/dose diskus inhaler  Generic drug: fluticasone-salmeterol 250-50 mcg/dose  INHALE 1 PUFF INTO THE LUNGS 2 (TWO) TIMES DAILY. CONTROLLER     albuterol 90 mcg/actuation inhaler  Commonly known as: PROVENTIL/VENTOLIN HFA     ALPRAZolam 0.5 MG tablet  Commonly known as: XANAX     bacitracin 500 unit/gram Oint  Apply topically 2 (two) times daily.     buPROPion 300 MG 24 hr tablet  Commonly known as: WELLBUTRIN XL     busPIRone 15 MG tablet  Commonly known as: BUSPAR     butalbital-acetaminophen-caffeine -40 mg -40 mg per tablet  Commonly known as: FIORICET, ESGIC     montelukast 10 mg tablet  Commonly known as: SINGULAIR      omeprazole 40 MG capsule  Commonly known as: PRILOSEC  Take 1 capsule (40 mg total) by mouth once daily.     testosterone cypionate 200 mg/mL injection  Commonly known as: DEPOTESTOTERONE CYPIONATE     testosterone enanthate 200 mg/mL injection  Commonly known as: DELATESTRYL     traMADoL 50 mg tablet  Commonly known as: ULTRAM  Take 1 tablet (50 mg total) by mouth every 6 (six) hours as needed for Pain.             3. Oli Hendrickson MD  [address] [address]  [phone]    Schedule an appointment as soon as possible for a visit   As needed    [facility] Emergency Department  [address] [place] [id number] [phone]  Go to   If symptoms worsen       _______________________________     Please note that this dictation was completed with [employer], the computer voice recognition software.  Quite often unanticipated grammatical, syntax, homophones, and other interpretive errors are inadvertently transcribed by the computer software.  Please disregard these errors.  Please excuse any errors that have escaped final proofreading.             Clarence Thrasher MD  02/28/23 0653

## 2023-04-09 ENCOUNTER — HOSPITAL ENCOUNTER (EMERGENCY)
Facility: HOSPITAL | Age: 43
Discharge: HOME | End: 2023-04-09
Attending: EMERGENCY MEDICINE
Payer: MEDICAID

## 2023-04-09 VITALS
BODY MASS INDEX: 25.06 KG/M2 | DIASTOLIC BLOOD PRESSURE: 104 MMHG | TEMPERATURE: 98 F | RESPIRATION RATE: 16 BRPM | HEART RATE: 84 BPM | WEIGHT: 185 LBS | SYSTOLIC BLOOD PRESSURE: 152 MMHG | HEIGHT: 72 IN | OXYGEN SATURATION: 98 %

## 2023-04-09 DIAGNOSIS — M79.5 FOREIGN BODY (FB) IN SOFT TISSUE: Primary | ICD-10-CM

## 2023-04-09 PROCEDURE — 10120 INC&RMVL FB SUBQ TISS SMPL: CPT

## 2023-04-09 PROCEDURE — 99282 EMERGENCY DEPT VISIT SF MDM: CPT | Mod: 25

## 2023-04-09 PROCEDURE — 25000003 PHARM REV CODE 250: Performed by: EMERGENCY MEDICINE

## 2023-04-09 RX ORDER — LIDOCAINE HYDROCHLORIDE 10 MG/ML
5 INJECTION, SOLUTION EPIDURAL; INFILTRATION; INTRACAUDAL; PERINEURAL
Status: COMPLETED | OUTPATIENT
Start: 2023-04-09 | End: 2023-04-09

## 2023-04-09 RX ADMIN — LIDOCAINE HYDROCHLORIDE 50 MG: 10 INJECTION, SOLUTION EPIDURAL; INFILTRATION; INTRACAUDAL; PERINEURAL at 01:04

## 2023-04-09 NOTE — ED PROVIDER NOTES
Encounter Date: 4/9/2023       History     Chief Complaint   Patient presents with    Foreign Body in Skin     Pt presents to ED with hook from crankbait to posterior side of neck.      41 yo male here via POV with complaint of fishing hook in skin of posterior neck just PTA. Brief attempt at removal unsuccessful. Pain worse with movement of lure. Alleviated when still. No active bleeding.     Review of patient's allergies indicates:  No Known Allergies  History reviewed. No pertinent past medical history.  No past surgical history on file.  No family history on file.  Social History     Tobacco Use    Smoking status: Never    Smokeless tobacco: Never   Substance Use Topics    Drug use: Yes     Types: Marijuana     Review of Systems   Constitutional: Negative.    Respiratory: Negative.     Cardiovascular: Negative.    Gastrointestinal: Negative.    All other systems reviewed and are negative.    Physical Exam     Initial Vitals [04/09/23 1302]   BP Pulse Resp Temp SpO2   (!) 152/104 84 16 97.9 °F (36.6 °C) 98 %      MAP       --         Physical Exam    Nursing note and vitals reviewed.  Constitutional: He appears well-developed and well-nourished. He is not diaphoretic. No distress.   HENT:   Head: Normocephalic and atraumatic.   Eyes: EOM are normal. Pupils are equal, round, and reactive to light.   Neck: Neck supple.   Normal range of motion.  Cardiovascular:  Normal rate, regular rhythm and intact distal pulses.           Pulmonary/Chest: Breath sounds normal. No respiratory distress. He has no wheezes. He has no rales.   Abdominal: Abdomen is soft. Bowel sounds are normal. He exhibits no distension. There is no abdominal tenderness. There is no rebound.   Musculoskeletal:         General: No tenderness or edema. Normal range of motion.      Cervical back: Normal range of motion and neck supple.     Neurological: He is alert and oriented to person, place, and time.   Skin: Skin is warm and dry. Capillary refill  takes less than 2 seconds.   Single hook of a treble hook lodged in skin posterior neck   Psychiatric: He has a normal mood and affect. Thought content normal.       ED Course   Foreign Body    Date/Time: 4/9/2023 1:44 PM  Performed by: Jarvis Talavera MD  Authorized by: Jarvis Talavera MD   Body area: skin  General location: head/neck  Location details: neck  Anesthesia: local infiltration    Anesthesia:  Local Anesthetic: lidocaine 1% without epinephrine  Anesthetic total: 3 mL    Patient sedated: no  Patient restrained: no  Patient cooperative: yes  Localization method: visualized  Removal mechanism: forceps  Dressing: dressing applied  Tendon involvement: none  Depth: subcutaneous  Complexity: simple  1 objects recovered.  Objects recovered: fish hook  Post-procedure assessment: foreign body removed  Patient tolerance: Patient tolerated the procedure well with no immediate complications    Labs Reviewed - No data to display       Imaging Results    None          Medications   LIDOcaine (PF) 10 mg/ml (1%) injection 50 mg (50 mg Infiltration Given 4/9/23 1312)                              Clinical Impression:   Final diagnoses:  [M79.5] Foreign body (FB) in soft tissue (Primary)        ED Disposition Condition    Discharge Stable          ED Prescriptions    None       Follow-up Information       Follow up With Specialties Details Why Contact Info    Oli Hendrickson MD Internal Medicine Schedule an appointment as soon as possible for a visit   [address] [address]  [phone]               Jarvis Talavera MD  04/09/23 0155

## 2024-01-03 ENCOUNTER — HOSPITAL ENCOUNTER (EMERGENCY)
Facility: HOSPITAL | Age: 44
Discharge: HOME | End: 2024-01-03
Attending: EMERGENCY MEDICINE
Payer: MEDICAID

## 2024-01-03 VITALS
TEMPERATURE: 98 F | DIASTOLIC BLOOD PRESSURE: 93 MMHG | SYSTOLIC BLOOD PRESSURE: 125 MMHG | OXYGEN SATURATION: 98 % | BODY MASS INDEX: 25.06 KG/M2 | HEART RATE: 88 BPM | HEIGHT: 72 IN | RESPIRATION RATE: 16 BRPM | WEIGHT: 185 LBS

## 2024-01-03 DIAGNOSIS — M70.22 OLECRANON BURSITIS OF LEFT ELBOW: Primary | ICD-10-CM

## 2024-01-03 PROCEDURE — 99283 EMERGENCY DEPT VISIT LOW MDM: CPT

## 2024-01-03 RX ORDER — NAPROXEN 500 MG/1
500 TABLET ORAL EVERY 12 HOURS PRN
Qty: 20 TABLET | Refills: 0 | Status: SHIPPED | OUTPATIENT
Start: 2024-01-03

## 2024-01-04 NOTE — ED PROVIDER NOTES
EMERGENCY DEPARTMENT HISTORY AND PHYSICAL EXAM     This note is dictated on M*Modal word recognition program.  There are word recognition mistakes and grammatical errors that are occasionally missed on review.     Date: 1/3/2024   Patient Name: Yasir Coe Jr.       History of Presenting Illness      Chief Complaint   Patient presents with    Joint Swelling     Pt stated that for the past 2 weeks he has been experiencing left elbow swelling - no known injury/trauma.            Yasir Coe Jr. is a 43 y.o. male with PMHX of denies significant history who presents to the emergency department C/O left elbow pain.     Patient works as a .  Right-hand dominant.  Reports left elbow pain and swelling.  Reports swelling was more severe this afternoon in his son requested he go get checked out.  Reports left posterior elbow down to forearm.  Reports swelling has improved since this afternoon.      PCP: Oli Hendrickson MD        No current facility-administered medications for this encounter.     Current Outpatient Medications   Medication Sig Dispense Refill    ADVAIR DISKUS 250-50 mcg/dose diskus inhaler INHALE 1 PUFF INTO THE LUNGS 2 (TWO) TIMES DAILY. CONTROLLER 60 each 1    albuterol (PROVENTIL/VENTOLIN HFA) 90 mcg/actuation inhaler       ALPRAZolam (XANAX) 0.5 MG tablet Take 0.5 mg by mouth nightly as needed for Anxiety.      bacitracin 500 unit/gram Oint Apply topically 2 (two) times daily. (Patient taking differently: Apply topically 2 (two) times daily as needed.) 30 g 0    buPROPion (WELLBUTRIN XL) 300 MG 24 hr tablet       busPIRone (BUSPAR) 15 MG tablet Take 15 mg by mouth once daily.      butalbital-acetaminophen-caffeine -40 mg (FIORICET, ESGIC) -40 mg per tablet TAKE 1 2 TABLET(S) BY MOUTH EVERY 6 HOURS AS NEEDED FOR HEADACHE. DO NOT TAKE MORE THAN 5 PER DAY      montelukast (SINGULAIR) 10 mg tablet TAKE 1 TABLET BY MOUTH DAILY AS NEEDED FOR SINUS OR RASH      naproxen (NAPROSYN)  500 MG tablet Take 1 tablet (500 mg total) by mouth every 12 (twelve) hours as needed (Pain). 20 tablet 0    omeprazole (PRILOSEC) 40 MG capsule Take 1 capsule (40 mg total) by mouth once daily. 30 capsule 11    testosterone cypionate (DEPOTESTOTERONE CYPIONATE) 200 mg/mL injection       testosterone enanthate (DELATESTRYL) 200 mg/mL injection Inject into the muscle every 14 (fourteen) days. Pt takes testosterone 2 x's a week      traMADoL (ULTRAM) 50 mg tablet Take 1 tablet (50 mg total) by mouth every 6 (six) hours as needed for Pain. 8 tablet 0           Past History     Past Medical History:   History reviewed. No pertinent past medical history.     Past Surgical History:   No past surgical history on file.     Family History:   No family history on file.     Social History:   Social History     Tobacco Use    Smoking status: Never    Smokeless tobacco: Never   Substance Use Topics    Drug use: Yes     Types: Marijuana        Allergies:   Review of patient's allergies indicates:  No Known Allergies       Review of Systems   Review of Systems   See HPI for pertinent positives and negatives       Physical Exam     Vitals:    01/03/24 1919   BP: (!) 125/93   Pulse: 88   Resp: 16   Temp: 98.3 °F (36.8 °C)   SpO2: 98%   Weight: 83.9 kg (185 lb)   Height: 6' (1.829 m)      Physical Exam  Vitals and nursing note reviewed.   Constitutional:       General: He is not in acute distress.     Appearance: Normal appearance. He is well-developed. He is not ill-appearing.   HENT:      Head: Normocephalic and atraumatic.   Eyes:      Extraocular Movements: Extraocular movements intact.      Conjunctiva/sclera: Conjunctivae normal.   Pulmonary:      Effort: Pulmonary effort is normal. No respiratory distress.   Musculoskeletal:         General: No deformity or signs of injury. Normal range of motion.      Cervical back: Normal range of motion. No rigidity.      Comments: Mild fullness of left olecranon bursa, patient reports  tenderness over medial aspect left elbow   Skin:     General: Skin is dry.      Coloration: Skin is not pale.      Findings: No rash.   Neurological:      General: No focal deficit present.      Mental Status: He is alert and oriented to person, place, and time.      Cranial Nerves: No cranial nerve deficit.      Motor: No weakness.      Coordination: Coordination normal.              Diagnostic Study Results      Labs -   No results found for this or any previous visit (from the past 12 hour(s)).     Radiologic Studies -    No orders to display        Medications given in the ED-   Medications - No data to display        Medical Decision Making    I am the first provider for this patient.     I reviewed the vital signs, available nursing notes, past medical history, past surgical history, family history and social history.     Vital Signs:  Reviewed the patient's vital signs.     Pulse Oximetry Analysis and Interpretation:    98% on Room Air, normal    External Test Results (Pertinent to encounter):    Records Reviewed: Nursing Notes    History Obtained By: Patient    Provider Notes: Yasir Coe Jr. is a 43 y.o. male with left elbow pain    Co-morbidities Considered:     Differential Diagnosis:  Olecranon bursitis, tendinitis      ED Course:    Exam is consistent with a very mild case of olecranon bursitis.  No significant fluid collection.  No indication of joint space infection.  Discussed management including rest, avoiding contact or injury or repetitive motions.  Recommend RICE, anti-inflammatory.         Problems Addressed:  Bursitis    Procedures:   Procedures       Diagnosis and Disposition     Critical Care:      DISCHARGE NOTE:       Yasir Coe Jr.'s  results have been reviewed with him.  He has been counseled regarding his diagnosis, treatment, and plan.  He verbally conveys understanding and agreement of the signs, symptoms, diagnosis, treatment and prognosis and additionally agrees to follow  up as discussed.  He also agrees with the care-plan and conveys that all of his questions have been answered.  I have also provided discharge instructions for him that include: educational information regarding their diagnosis and treatment, and list of reasons why they would want to return to the ED prior to their follow-up appointment, should his condition change. He has been provided with education for proper emergency department utilization.         CLINICAL IMPRESSION:         1. Olecranon bursitis of left elbow              PLAN:   1. Discharge Home  2.      Medication List        START taking these medications      naproxen 500 MG tablet  Commonly known as: NAPROSYN  Take 1 tablet (500 mg total) by mouth every 12 (twelve) hours as needed (Pain).            ASK your doctor about these medications      ADVAIR DISKUS 250-50 mcg/dose diskus inhaler  Generic drug: fluticasone-salmeterol 250-50 mcg/dose  INHALE 1 PUFF INTO THE LUNGS 2 (TWO) TIMES DAILY. CONTROLLER     albuterol 90 mcg/actuation inhaler  Commonly known as: PROVENTIL/VENTOLIN HFA     ALPRAZolam 0.5 MG tablet  Commonly known as: XANAX     bacitracin 500 unit/gram Oint  Apply topically 2 (two) times daily.     buPROPion 300 MG 24 hr tablet  Commonly known as: WELLBUTRIN XL     busPIRone 15 MG tablet  Commonly known as: BUSPAR     butalbital-acetaminophen-caffeine -40 mg -40 mg per tablet  Commonly known as: FIORICET, ESGIC     montelukast 10 mg tablet  Commonly known as: SINGULAIR     omeprazole 40 MG capsule  Commonly known as: PRILOSEC  Take 1 capsule (40 mg total) by mouth once daily.     testosterone cypionate 200 mg/mL injection  Commonly known as: DEPOTESTOTERONE CYPIONATE     testosterone enanthate 200 mg/mL injection  Commonly known as: DELATESTRYL     traMADoL 50 mg tablet  Commonly known as: ULTRAM  Take 1 tablet (50 mg total) by mouth every 6 (six) hours as needed for Pain.               Where to Get Your Medications        These  medications were sent to [facility]/pharmacy #[id number] - [place], [place] - [address]  [address], [facility] [address]      Phone: [phone]   naproxen 500 MG tablet        3. Oli Hendrickson MD  [address]  [facility] [address]  [phone]    Schedule an appointment as soon as possible for a visit   As needed    Pedro Luis Jenkins, ИРИНА  [address]  [address]  [place] [address]  [phone]    Schedule an appointment as soon as possible for a visit   As needed, Ortho       _______________________________     Please note that this dictation was completed with H?REL, the computer voice recognition software.  Quite often unanticipated grammatical, syntax, homophones, and other interpretive errors are inadvertently transcribed by the computer software.  Please disregard these errors.  Please excuse any errors that have escaped final proofreading.             Clarence Thrasher MD  01/03/24 [id number]

## 2024-02-07 ENCOUNTER — HOSPITAL ENCOUNTER (EMERGENCY)
Facility: HOSPITAL | Age: 44
Discharge: HOME | End: 2024-02-07
Attending: EMERGENCY MEDICINE
Payer: COMMERCIAL

## 2024-02-07 VITALS
DIASTOLIC BLOOD PRESSURE: 82 MMHG | HEIGHT: 72 IN | OXYGEN SATURATION: 99 % | BODY MASS INDEX: 25.6 KG/M2 | TEMPERATURE: 98 F | WEIGHT: 189 LBS | RESPIRATION RATE: 18 BRPM | HEART RATE: 75 BPM | SYSTOLIC BLOOD PRESSURE: 144 MMHG

## 2024-02-07 DIAGNOSIS — R10.9 ABDOMINAL PAIN: ICD-10-CM

## 2024-02-07 DIAGNOSIS — R10.31 RIGHT LOWER QUADRANT ABDOMINAL PAIN: Primary | ICD-10-CM

## 2024-02-07 DIAGNOSIS — K59.00 CONSTIPATION, UNSPECIFIED CONSTIPATION TYPE: ICD-10-CM

## 2024-02-07 LAB
ALBUMIN SERPL BCP-MCNC: 3.8 G/DL (ref 3.5–5.2)
ALP SERPL-CCNC: 54 U/L (ref 55–135)
ALT SERPL W/O P-5'-P-CCNC: 34 U/L (ref 10–44)
ANION GAP SERPL CALC-SCNC: 1 MMOL/L (ref 3–11)
AST SERPL-CCNC: 18 U/L (ref 10–40)
BACTERIA #/AREA URNS HPF: NEGATIVE /HPF
BASOPHILS # BLD AUTO: 0.12 K/UL (ref 0–0.2)
BASOPHILS NFR BLD: 1.6 % (ref 0–1.9)
BILIRUB SERPL-MCNC: 0.2 MG/DL (ref 0.1–1)
BILIRUB UR QL STRIP: NEGATIVE
BUN SERPL-MCNC: 14 MG/DL (ref 6–20)
CALCIUM SERPL-MCNC: 9.5 MG/DL (ref 8.7–10.5)
CHLORIDE SERPL-SCNC: 104 MMOL/L (ref 95–110)
CLARITY UR: ABNORMAL
CO2 SERPL-SCNC: 31 MMOL/L (ref 23–29)
COLOR UR: YELLOW
CREAT SERPL-MCNC: 1.4 MG/DL (ref 0.5–1.4)
DIFFERENTIAL METHOD BLD: NORMAL
EOSINOPHIL # BLD AUTO: 0.4 K/UL (ref 0–0.5)
EOSINOPHIL NFR BLD: 5.4 % (ref 0–8)
ERYTHROCYTE [DISTWIDTH] IN BLOOD BY AUTOMATED COUNT: 13.2 % (ref 11.5–14.5)
EST. GFR  (NO RACE VARIABLE): >60 ML/MIN/1.73 M^2
GLUCOSE SERPL-MCNC: 113 MG/DL (ref 70–110)
GLUCOSE UR QL STRIP: NEGATIVE
HCT VFR BLD AUTO: 47.5 % (ref 40–54)
HGB BLD-MCNC: 15.7 G/DL (ref 14–18)
HGB UR QL STRIP: NEGATIVE
HYALINE CASTS #/AREA URNS LPF: 0 /LPF
IMM GRANULOCYTES # BLD AUTO: 0.03 K/UL (ref 0–0.04)
IMM GRANULOCYTES NFR BLD AUTO: 0.4 % (ref 0–0.5)
KETONES UR QL STRIP: NEGATIVE
LEUKOCYTE ESTERASE UR QL STRIP: NEGATIVE
LIPASE SERPL-CCNC: 29 U/L (ref 13–75)
LYMPHOCYTES # BLD AUTO: 1.4 K/UL (ref 1–4.8)
LYMPHOCYTES NFR BLD: 19.5 % (ref 18–48)
MCH RBC QN AUTO: 30 PG (ref 27–31)
MCHC RBC AUTO-ENTMCNC: 33.1 G/DL (ref 32–36)
MCV RBC AUTO: 91 FL (ref 82–98)
MICROSCOPIC COMMENT: ABNORMAL
MONOCYTES # BLD AUTO: 0.6 K/UL (ref 0.3–1)
MONOCYTES NFR BLD: 7.7 % (ref 4–15)
NEUTROPHILS # BLD AUTO: 4.8 K/UL (ref 1.8–7.7)
NEUTROPHILS NFR BLD: 65.4 % (ref 38–73)
NITRITE UR QL STRIP: NEGATIVE
NRBC BLD-RTO: 0 /100 WBC
PH UR STRIP: 8 [PH] (ref 5–8)
PLATELET # BLD AUTO: 271 K/UL (ref 150–450)
PMV BLD AUTO: 11.7 FL (ref 9.2–12.9)
POTASSIUM SERPL-SCNC: 3.9 MMOL/L (ref 3.5–5.1)
PROT SERPL-MCNC: 7.9 G/DL (ref 6–8.4)
PROT UR QL STRIP: NEGATIVE
RBC # BLD AUTO: 5.24 M/UL (ref 4.6–6.2)
RBC #/AREA URNS HPF: 1 /HPF (ref 0–4)
SODIUM SERPL-SCNC: 136 MMOL/L (ref 136–145)
SP GR UR STRIP: 1.01 (ref 1–1.03)
SQUAMOUS #/AREA URNS HPF: 0 /HPF
URN SPEC COLLECT METH UR: ABNORMAL
UROBILINOGEN UR STRIP-ACNC: NEGATIVE EU/DL
WBC # BLD AUTO: 7.4 K/UL (ref 3.9–12.7)
WBC #/AREA URNS HPF: 0 /HPF (ref 0–5)

## 2024-02-07 PROCEDURE — 36415 COLL VENOUS BLD VENIPUNCTURE: CPT | Performed by: EMERGENCY MEDICINE

## 2024-02-07 PROCEDURE — 99284 EMERGENCY DEPT VISIT MOD MDM: CPT | Mod: 25

## 2024-02-07 PROCEDURE — 83690 ASSAY OF LIPASE: CPT | Performed by: EMERGENCY MEDICINE

## 2024-02-07 PROCEDURE — 85025 COMPLETE CBC W/AUTO DIFF WBC: CPT | Performed by: EMERGENCY MEDICINE

## 2024-02-07 PROCEDURE — 80053 COMPREHEN METABOLIC PANEL: CPT | Performed by: EMERGENCY MEDICINE

## 2024-02-07 PROCEDURE — 81000 URINALYSIS NONAUTO W/SCOPE: CPT | Performed by: EMERGENCY MEDICINE

## 2024-02-07 NOTE — ED PROVIDER NOTES
Encounter Date: 2/7/2024       History     Chief Complaint   Patient presents with    Abdominal Pain     Pt to the ER with complaints of right lower abdominal pain radiating to the back, onset few days. Denies V/N/D, last BM this AM. Pain 4/10. Denies taking meds for relief.      Pt presents to the ED for abd pain.  The pain began 4 days ago.  He states that since that time he has had the pain.  Pt was on a fishing tournament over the weekend and had to have a BM but ended up refraining for an extended timeframe.  He describes the pain as constant in nature.  It is 3-5/10.  He has a hard time describing the pain.  He states that the area is roughly the size of a baseball over the right lower quad.  He did not find that the pain improved following the BM.  He confirms some nausea but likens this to him drinking last night.  He has not had a fever.  Pt took some motrin w/o relief of the pain.     The history is provided by the patient.     Review of patient's allergies indicates:  No Known Allergies  History reviewed. No pertinent past medical history.  No past surgical history on file.  No family history on file.  Social History     Tobacco Use    Smoking status: Never    Smokeless tobacco: Never   Substance Use Topics    Drug use: Yes     Types: Marijuana     Review of Systems   Constitutional:  Negative for fatigue and fever.   HENT:  Negative for congestion and sore throat.    Respiratory:  Negative for shortness of breath.    Cardiovascular:  Negative for chest pain.   Gastrointestinal:  Positive for abdominal pain and nausea. Negative for constipation, diarrhea and vomiting.   Genitourinary:  Negative for flank pain, frequency and urgency.   All other systems reviewed and are negative.      Physical Exam     Initial Vitals [02/07/24 1314]   BP Pulse Resp Temp SpO2   (!) 144/82 75 18 98.1 °F (36.7 °C) 99 %      MAP       --         Physical Exam    Nursing note and vitals reviewed.  Constitutional: He appears  well-developed and well-nourished. No distress.   HENT:   Head: Normocephalic and atraumatic.   Eyes: EOM are normal. Pupils are equal, round, and reactive to light.   Neck: Neck supple.   Normal range of motion.  Cardiovascular:  Normal rate, regular rhythm and normal heart sounds.           No murmur heard.  Pulmonary/Chest: Breath sounds normal. He has no wheezes. He has no rhonchi. He has no rales.   Abdominal: Abdomen is soft. Bowel sounds are normal. He exhibits no distension. There is abdominal tenderness (RLQ). There is no rebound and no guarding.   Musculoskeletal:         General: Normal range of motion.      Cervical back: Normal range of motion and neck supple.     Neurological: He is alert and oriented to person, place, and time. GCS score is 15. GCS eye subscore is 4. GCS verbal subscore is 5. GCS motor subscore is 6.   Skin: Skin is warm and dry. Capillary refill takes less than 2 seconds.   Psychiatric: He has a normal mood and affect. His behavior is normal. Thought content normal.         ED Course   Procedures  Labs Reviewed   COMPREHENSIVE METABOLIC PANEL - Abnormal; Notable for the following components:       Result Value    CO2 31 (*)     Glucose 113 (*)     Alkaline Phosphatase 54 (*)     Anion Gap 1 (*)     All other components within normal limits   URINALYSIS, REFLEX TO URINE CULTURE - Abnormal; Notable for the following components:    Appearance, UA Cloudy (*)     All other components within normal limits    Narrative:     Preferred Collection Type->Urine, Clean Catch  Specimen Source->Urine   URINALYSIS MICROSCOPIC - Abnormal; Notable for the following components:    Hyaline Casts, UA 0.0 (*)     All other components within normal limits    Narrative:     Preferred Collection Type->Urine, Clean Catch  Specimen Source->Urine   CBC W/ AUTO DIFFERENTIAL   LIPASE          Imaging Results              X-Ray Abdomen AP 1 View (KUB) (Final result)  Result time 02/07/24 14:27:27      Final result  by Ni Keys MD (02/07/24 14:27:27)                   Impression:      Nonobstructive bowel gas pattern.  Stool throughout the colon      Electronically signed by: Ni Keys MD  Date:    02/07/2024  Time:    14:27               Narrative:    EXAMINATION:  XR ABDOMEN AP 1 VIEW    CLINICAL HISTORY:  Unspecified abdominal pain    COMPARISON:  None.    FINDINGS:  There is a nonobstructive bowel gas pattern. No masses are seen.  Stool throughout the colon                                       Medications - No data to display  Medical Decision Making  Patient presented to the ED complaining of abdominal pain.  The pain was right-sided.  Workup this afternoon has been unremarkable for the exact cause of the abdominal pain.  His physical exam is unremarkable and benign.  I do not believe that he needs a CT scan this afternoon.  Lab work is unremarkable.  I discussed the results of the imaging and lab work with him.  The imaging was suggestive of constipation.  There was no free air noted.  I will write him for a bowel prep. After careful review history, physical and supporting data, there is very low suspicion for an acute abdomen.  Differentials include, but not limited to appendicitis, torsion, perforated viscus, ischemic bowel, obstruction, and infarct.  The patient's symptoms have improved from presentation and appears to be clinically well.  Patient re-examined prior to discharge and appears to have improved as well.  Patient has been encouraged to follow up with his/her PCP and return to the ED with any lack of improvement or worsening symptoms.    Amount and/or Complexity of Data Reviewed  Labs: ordered.  Radiology: ordered.                  Clinical Impression:  Final diagnoses:  [R10.9] Abdominal pain  [R10.31] Right lower quadrant abdominal pain (Primary)  [K59.00] Constipation, unspecified constipation type          ED Disposition Condition    Discharge Stable          ED Prescriptions    None        Follow-up Information       Follow up With Specialties Details Why Contact Info    Oli Hendrickson MD Internal Medicine Call in 3 days As needed [address]  [facility] [address]  [phone]               Zenon Funk MD  02/07/24 7590

## 2024-02-07 NOTE — DISCHARGE INSTRUCTIONS
Please read all instructions prior to beginning.    Purchase a 238 g (8.3 oz) bottle of MiraLax and for Dulcolax laxative pills (not stool softeners) at the pharmacy (over-the-counter).  Also purchase two 32 oz bottles of Gatorade (NO RED or other bright colors).      It is important to drink as much fluid as you can throughout the day to avoid dehydration and help to flush your colon.  Your total fluid intake should be at least 200 oz.    Take the for Dulcolax laxative pills with an 8 oz glass of water.    Mix half of the MiraLax powder with the 1st 32 oz bottle of Gatorade in a large picture.  Stir until completely dissolved.  Drink in 8 oz glass of the solution every 15 minutes until you finish the mixture.  It may take 1-2 hours before he began to have bowel movements.  If he develops cramping, bloating or nausea while drinking the preparation, you should stop drinking for about a half an hour.  You can then resume drinking this solution.  The bowel movements were usually continue for several hours after completing the bowel prep.    Once the 1st bottle of solution is finished, if you continued to have stools then you will need to mix the other half of the MiraLax with a 2nd 32 oz bottle of Gatorade.  Continue drinking an 8 oz glass of the solution every 15 minutes until you finish the mixture or the bowel movements become clear.  Once the bowel movements become clear you can stop drinking this solution.

## 2024-05-11 ENCOUNTER — HOSPITAL ENCOUNTER (EMERGENCY)
Facility: HOSPITAL | Age: 44
Discharge: HOME | End: 2024-05-11
Attending: EMERGENCY MEDICINE
Payer: COMMERCIAL

## 2024-05-11 VITALS
RESPIRATION RATE: 20 BRPM | TEMPERATURE: 98 F | BODY MASS INDEX: 25.42 KG/M2 | HEIGHT: 72 IN | OXYGEN SATURATION: 100 % | HEART RATE: 65 BPM | WEIGHT: 187.69 LBS | SYSTOLIC BLOOD PRESSURE: 150 MMHG | DIASTOLIC BLOOD PRESSURE: 94 MMHG

## 2024-05-11 DIAGNOSIS — K80.20 CALCULUS OF GALLBLADDER WITHOUT CHOLECYSTITIS WITHOUT OBSTRUCTION: ICD-10-CM

## 2024-05-11 DIAGNOSIS — K80.50 BILIARY COLIC: Primary | ICD-10-CM

## 2024-05-11 DIAGNOSIS — N28.89 RENAL MASS, LEFT: ICD-10-CM

## 2024-05-11 DIAGNOSIS — N28.1 RENAL CYST, RIGHT: ICD-10-CM

## 2024-05-11 LAB
ALBUMIN SERPL BCP-MCNC: 3.6 G/DL (ref 3.5–5.2)
ALP SERPL-CCNC: 54 U/L (ref 55–135)
ALT SERPL W/O P-5'-P-CCNC: 28 U/L (ref 10–44)
AMPHET+METHAMPHET UR QL: NEGATIVE
ANION GAP SERPL CALC-SCNC: 4 MMOL/L (ref 3–11)
AST SERPL-CCNC: 24 U/L (ref 10–40)
BARBITURATES UR QL SCN>200 NG/ML: NEGATIVE
BASOPHILS # BLD AUTO: 0.13 K/UL (ref 0–0.2)
BASOPHILS NFR BLD: 1.8 % (ref 0–1.9)
BENZODIAZ UR QL SCN>200 NG/ML: ABNORMAL
BILIRUB SERPL-MCNC: 0.3 MG/DL (ref 0.1–1)
BILIRUB UR QL STRIP: NEGATIVE
BUN SERPL-MCNC: 14 MG/DL (ref 6–20)
BZE UR QL SCN: NEGATIVE
CALCIUM SERPL-MCNC: 9.1 MG/DL (ref 8.7–10.5)
CANNABINOIDS UR QL SCN: NEGATIVE
CHLORIDE SERPL-SCNC: 106 MMOL/L (ref 95–110)
CLARITY UR: CLEAR
CO2 SERPL-SCNC: 26 MMOL/L (ref 23–29)
COLOR UR: YELLOW
CREAT SERPL-MCNC: 1.2 MG/DL (ref 0.5–1.4)
CREAT UR-MCNC: 14.5 MG/DL (ref 23–375)
DIFFERENTIAL METHOD BLD: ABNORMAL
EOSINOPHIL # BLD AUTO: 0.5 K/UL (ref 0–0.5)
EOSINOPHIL NFR BLD: 7.4 % (ref 0–8)
ERYTHROCYTE [DISTWIDTH] IN BLOOD BY AUTOMATED COUNT: 13.2 % (ref 11.5–14.5)
EST. GFR  (NO RACE VARIABLE): >60 ML/MIN/1.73 M^2
GLUCOSE SERPL-MCNC: 107 MG/DL (ref 70–110)
GLUCOSE UR QL STRIP: NEGATIVE
HCT VFR BLD AUTO: 49.4 % (ref 40–54)
HGB BLD-MCNC: 16.6 G/DL (ref 14–18)
HGB UR QL STRIP: NEGATIVE
IMM GRANULOCYTES # BLD AUTO: 0.02 K/UL (ref 0–0.04)
IMM GRANULOCYTES NFR BLD AUTO: 0.3 % (ref 0–0.5)
KETONES UR QL STRIP: NEGATIVE
LEUKOCYTE ESTERASE UR QL STRIP: NEGATIVE
LIPASE SERPL-CCNC: 31 U/L (ref 13–75)
LYMPHOCYTES # BLD AUTO: 1.1 K/UL (ref 1–4.8)
LYMPHOCYTES NFR BLD: 15.9 % (ref 18–48)
MCH RBC QN AUTO: 30.6 PG (ref 27–31)
MCHC RBC AUTO-ENTMCNC: 33.6 G/DL (ref 32–36)
MCV RBC AUTO: 91 FL (ref 82–98)
METHADONE UR QL SCN>300 NG/ML: NEGATIVE
MONOCYTES # BLD AUTO: 0.6 K/UL (ref 0.3–1)
MONOCYTES NFR BLD: 8.5 % (ref 4–15)
NEUTROPHILS # BLD AUTO: 4.6 K/UL (ref 1.8–7.7)
NEUTROPHILS NFR BLD: 66.1 % (ref 38–73)
NITRITE UR QL STRIP: NEGATIVE
NRBC BLD-RTO: 0 /100 WBC
OPIATES UR QL SCN: NEGATIVE
PCP UR QL SCN>25 NG/ML: NEGATIVE
PH UR STRIP: >8 [PH] (ref 5–8)
PLATELET # BLD AUTO: 281 K/UL (ref 150–450)
PMV BLD AUTO: 11.3 FL (ref 9.2–12.9)
POTASSIUM SERPL-SCNC: 3.9 MMOL/L (ref 3.5–5.1)
PROT SERPL-MCNC: 7.4 G/DL (ref 6–8.4)
PROT UR QL STRIP: NEGATIVE
RBC # BLD AUTO: 5.42 M/UL (ref 4.6–6.2)
SODIUM SERPL-SCNC: 136 MMOL/L (ref 136–145)
SP GR UR STRIP: >=1.03 (ref 1–1.03)
TOXICOLOGY INFORMATION: ABNORMAL
URN SPEC COLLECT METH UR: ABNORMAL
UROBILINOGEN UR STRIP-ACNC: NEGATIVE EU/DL
WBC # BLD AUTO: 7.03 K/UL (ref 3.9–12.7)

## 2024-05-11 PROCEDURE — 83690 ASSAY OF LIPASE: CPT | Performed by: EMERGENCY MEDICINE

## 2024-05-11 PROCEDURE — 36415 COLL VENOUS BLD VENIPUNCTURE: CPT | Performed by: EMERGENCY MEDICINE

## 2024-05-11 PROCEDURE — 85025 COMPLETE CBC W/AUTO DIFF WBC: CPT | Performed by: EMERGENCY MEDICINE

## 2024-05-11 PROCEDURE — 96374 THER/PROPH/DIAG INJ IV PUSH: CPT | Mod: 59

## 2024-05-11 PROCEDURE — 63600175 PHARM REV CODE 636 W HCPCS: Performed by: EMERGENCY MEDICINE

## 2024-05-11 PROCEDURE — 96372 THER/PROPH/DIAG INJ SC/IM: CPT | Performed by: EMERGENCY MEDICINE

## 2024-05-11 PROCEDURE — 96361 HYDRATE IV INFUSION ADD-ON: CPT

## 2024-05-11 PROCEDURE — 25500020 PHARM REV CODE 255: Performed by: EMERGENCY MEDICINE

## 2024-05-11 PROCEDURE — 80053 COMPREHEN METABOLIC PANEL: CPT | Performed by: EMERGENCY MEDICINE

## 2024-05-11 PROCEDURE — 25000003 PHARM REV CODE 250: Performed by: EMERGENCY MEDICINE

## 2024-05-11 PROCEDURE — 99285 EMERGENCY DEPT VISIT HI MDM: CPT | Mod: 25

## 2024-05-11 PROCEDURE — 80307 DRUG TEST PRSMV CHEM ANLYZR: CPT | Performed by: EMERGENCY MEDICINE

## 2024-05-11 PROCEDURE — 96375 TX/PRO/DX INJ NEW DRUG ADDON: CPT

## 2024-05-11 PROCEDURE — 81003 URINALYSIS AUTO W/O SCOPE: CPT | Mod: 59 | Performed by: EMERGENCY MEDICINE

## 2024-05-11 RX ORDER — KETOROLAC TROMETHAMINE 30 MG/ML
15 INJECTION, SOLUTION INTRAMUSCULAR; INTRAVENOUS
Status: COMPLETED | OUTPATIENT
Start: 2024-05-11 | End: 2024-05-11

## 2024-05-11 RX ORDER — MORPHINE SULFATE 4 MG/ML
4 INJECTION, SOLUTION INTRAMUSCULAR; INTRAVENOUS
Status: COMPLETED | OUTPATIENT
Start: 2024-05-11 | End: 2024-05-11

## 2024-05-11 RX ORDER — DICYCLOMINE HYDROCHLORIDE 10 MG/ML
20 INJECTION INTRAMUSCULAR
Status: COMPLETED | OUTPATIENT
Start: 2024-05-11 | End: 2024-05-11

## 2024-05-11 RX ORDER — ONDANSETRON 4 MG/1
4 TABLET, ORALLY DISINTEGRATING ORAL EVERY 8 HOURS PRN
Qty: 8 TABLET | Refills: 0 | Status: SHIPPED | OUTPATIENT
Start: 2024-05-11

## 2024-05-11 RX ORDER — NAPROXEN 500 MG/1
500 TABLET ORAL EVERY 12 HOURS PRN
Qty: 20 TABLET | Refills: 0 | Status: SHIPPED | OUTPATIENT
Start: 2024-05-11

## 2024-05-11 RX ADMIN — KETOROLAC TROMETHAMINE 15 MG: 30 INJECTION, SOLUTION INTRAMUSCULAR at 10:05

## 2024-05-11 RX ADMIN — SODIUM CHLORIDE 1000 ML: 9 INJECTION, SOLUTION INTRAVENOUS at 09:05

## 2024-05-11 RX ADMIN — DICYCLOMINE HYDROCHLORIDE 20 MG: 10 INJECTION, SOLUTION INTRAMUSCULAR at 09:05

## 2024-05-11 RX ADMIN — MORPHINE SULFATE 4 MG: 4 INJECTION INTRAVENOUS at 09:05

## 2024-05-11 RX ADMIN — IOHEXOL 100 ML: 350 INJECTION, SOLUTION INTRAVENOUS at 09:05

## 2024-05-11 NOTE — ED NOTES
Spoke with Shahida in radiology. He is calling out Ultrasound. Tech is on call at another facility and just received a call-out. She will complete first call-out then come to [facility]. MD notified.

## 2024-05-11 NOTE — ED PROVIDER NOTES
"EMERGENCY DEPARTMENT HISTORY AND PHYSICAL EXAM     This note is dictated on M*Modal word recognition program.  There are word recognition mistakes and grammatical errors that are occasionally missed on review.     Date: 5/11/2024   Patient Name: Yasir Coe Jr.       History of Presenting Illness      Chief Complaint   Patient presents with    Abdominal Pain     Patient reports abdominal pain onset "a few hours ago". Patient stated that he took a pepto bismal and he "thinks a Lorazepam that my girlfriend had".         0912   Yasir Coe Jr. is a 43 y.o. male with PMHX of GERD, anxiety who presents to the emergency department C/O abdominal pain.    Patient reports waking up with generalized abdominal pain this morning.  States he has not had pain like this before.  States he needs something for pain.  No vomiting.  Took a lorazepam prescribed to his girlfriend but says that did not help.  Denies constipation and reports that he has multiple bowel movements.      PCP: Oli Hendrickson MD        No current facility-administered medications for this encounter.     Current Outpatient Medications   Medication Sig Dispense Refill    ADVAIR DISKUS 250-50 mcg/dose diskus inhaler INHALE 1 PUFF INTO THE LUNGS 2 (TWO) TIMES DAILY. CONTROLLER 60 each 1    albuterol (PROVENTIL/VENTOLIN HFA) 90 mcg/actuation inhaler       ALPRAZolam (XANAX) 0.5 MG tablet Take 0.5 mg by mouth nightly as needed for Anxiety.      bacitracin 500 unit/gram Oint Apply topically 2 (two) times daily. (Patient taking differently: Apply topically 2 (two) times daily as needed.) 30 g 0    buPROPion (WELLBUTRIN XL) 300 MG 24 hr tablet       busPIRone (BUSPAR) 15 MG tablet Take 15 mg by mouth once daily.      butalbital-acetaminophen-caffeine -40 mg (FIORICET, ESGIC) -40 mg per tablet TAKE 1 2 TABLET(S) BY MOUTH EVERY 6 HOURS AS NEEDED FOR HEADACHE. DO NOT TAKE MORE THAN 5 PER DAY      montelukast (SINGULAIR) 10 mg tablet TAKE 1 " TABLET BY MOUTH DAILY AS NEEDED FOR SINUS OR RASH      naproxen (NAPROSYN) 500 MG tablet Take 1 tablet (500 mg total) by mouth every 12 (twelve) hours as needed (Pain). 20 tablet 0    naproxen (NAPROSYN) 500 MG tablet Take 1 tablet (500 mg total) by mouth every 12 (twelve) hours as needed (Pain). 20 tablet 0    omeprazole (PRILOSEC) 40 MG capsule Take 1 capsule (40 mg total) by mouth once daily. 30 capsule 11    ondansetron (ZOFRAN-ODT) 4 MG TbDL Take 1 tablet (4 mg total) by mouth every 8 (eight) hours as needed ([place]a). 8 tablet 0    testosterone cypionate (DEPOTESTOTERONE CYPIONATE) 200 mg/mL injection       testosterone enanthate (DELATESTRYL) 200 mg/mL injection Inject into the muscle every 14 (fourteen) days. Pt takes testosterone 2 x's a week      traMADoL (ULTRAM) 50 mg tablet Take 1 tablet (50 mg total) by mouth every 6 (six) hours as needed for Pain. 8 tablet 0           Past History     Past Medical History:   No past medical history on file.     Past Surgical History:   No past surgical history on file.     Family History:   No family history on file.     Social History:   Social History     Tobacco Use    Smoking status: Never    Smokeless tobacco: Never   Substance Use Topics    Drug use: Yes     Types: Marijuana        Allergies:   Review of patient's allergies indicates:  No Known Allergies       Review of Systems   Review of Systems   See HPI for pertinent positives and negatives       Physical Exam     Vitals:    05/11/24 0853 05/11/24 0855 05/11/24 0933 05/11/24 1156   BP: (!) 163/113      Pulse: 68      Resp: 18  20    Temp: 97.6 °F (36.4 °C)      TempSrc: Oral      SpO2: 100%      Weight:  84.8 kg (186 lb 15.2 oz)  85.1 kg (187 lb 11.2 oz)   Height:  6' (1.829 m)        Physical Exam  Vitals and nursing note reviewed.   Constitutional:       Appearance: Normal appearance. He is well-developed. He is not ill-appearing.      Comments: Appears uncomfortable, initially patient was on  elbows and knees on bed   HENT:      Head: Normocephalic and atraumatic.   Eyes:      Extraocular Movements: Extraocular movements intact.      Conjunctiva/sclera: Conjunctivae normal.   Cardiovascular:      Rate and Rhythm: Normal rate and regular rhythm.   Pulmonary:      Effort: Pulmonary effort is normal. No respiratory distress.   Abdominal:      General: Abdomen is flat. Bowel sounds are normal.      Palpations: Abdomen is soft.      Tenderness: There is generalized abdominal tenderness and tenderness in the right upper quadrant and epigastric area. There is guarding (voluntary). There is no rebound. Negative signs include McBurney's sign.   Musculoskeletal:         General: No deformity or signs of injury. Normal range of motion.      Cervical back: Normal range of motion. No rigidity.   Skin:     General: Skin is dry.      Coloration: Skin is not pale.      Findings: No rash.   Neurological:      General: No focal deficit present.      Mental Status: He is alert and oriented to person, place, and time.      Cranial Nerves: No cranial nerve deficit.      Motor: No weakness.      Coordination: Coordination normal.   Psychiatric:         Attention and Perception: Attention normal.         Mood and Affect: Mood is anxious.         Behavior: Behavior is agitated.              Diagnostic Study Results      Labs -   Recent Results (from the past 12 hour(s))   CBC Auto Differential    Collection Time: 05/11/24  9:43 AM   Result Value Ref Range    WBC 7.03 3.90 - 12.70 K/uL    RBC 5.42 4.60 - 6.20 M/uL    Hemoglobin 16.6 14.0 - 18.0 g/dL    Hematocrit 49.4 40.0 - 54.0 %    MCV 91 82 - 98 fL    MCH 30.6 27.0 - 31.0 pg    MCHC 33.6 32.0 - 36.0 g/dL    RDW 13.2 11.5 - 14.5 %    Platelets 281 150 - 450 K/uL    MPV 11.3 9.2 - 12.9 fL    Immature Granulocytes 0.3 0.0 - 0.5 %    Gran # (ANC) 4.6 1.8 - 7.7 K/uL    Immature Grans (Abs) 0.02 0.00 - 0.04 K/uL    Lymph # 1.1 1.0 - 4.8 K/uL    Mono # 0.6 0.3 - 1.0 K/uL    Eos #  0.5 0.0 - 0.5 K/uL    Baso # 0.13 0.00 - 0.20 K/uL    nRBC 0 0 /100 WBC    Gran % 66.1 38.0 - 73.0 %    Lymph % 15.9 (L) 18.0 - 48.0 %    Mono % 8.5 4.0 - 15.0 %    Eosinophil % 7.4 0.0 - 8.0 %    Basophil % 1.8 0.0 - 1.9 %    Differential Method Automated    Comprehensive Metabolic Panel    Collection Time: 05/11/24  9:43 AM   Result Value Ref Range    Sodium 136 136 - 145 mmol/L    Potassium 3.9 3.5 - 5.1 mmol/L    Chloride 106 95 - 110 mmol/L    CO2 26 23 - 29 mmol/L    Glucose 107 70 - 110 mg/dL    BUN 14 6 - 20 mg/dL    Creatinine 1.2 0.5 - 1.4 mg/dL    Calcium 9.1 8.7 - 10.5 mg/dL    Total Protein 7.4 6.0 - 8.4 g/dL    Albumin 3.6 3.5 - 5.2 g/dL    Total Bilirubin 0.3 0.1 - 1.0 mg/dL    Alkaline Phosphatase 54 (L) 55 - 135 U/L    AST 24 10 - 40 U/L    ALT 28 10 - 44 U/L    eGFR >60.0 >60 mL/min/1.73 m^2    Anion Gap 4 3 - 11 mmol/L   Lipase    Collection Time: 05/11/24  9:43 AM   Result Value Ref Range    Lipase 31 13 - 75 U/L   Urinalysis, Reflex to Urine Culture Urine, Clean Catch    Collection Time: 05/11/24  9:56 AM    Specimen: Urine, Clean Catch   Result Value Ref Range    Specimen UA Urine, Clean Catch     Color, UA Yellow Yellow, Straw, Melba    Appearance, UA Clear Clear    pH, UA >8.0 (A) 5.0 - 8.0    Specific Gravity, UA >=1.030 (A) 1.005 - 1.030    Protein, UA Negative Negative    Glucose, UA Negative Negative    Ketones, UA Negative Negative    Bilirubin (UA) Negative Negative    Occult Blood UA Negative Negative    Nitrite, UA Negative Negative    Urobilinogen, UA Negative <2.0 EU/dL    Leukocytes, UA Negative Negative   Drug screen panel, in-house    Collection Time: 05/11/24  9:56 AM   Result Value Ref Range    Benzodiazepines Presumptive Positive (A) Negative    Methadone metabolites Negative Negative    Cocaine (Metab.) Negative Negative    Opiate Scrn, Ur Negative Negative    Barbiturate Screen, Ur Negative Negative    Amphetamine Screen, Ur Negative Negative    THC Negative Negative     Phencyclidine Negative Negative    Creatinine, Urine 14.5 (L) 23.0 - 375.0 mg/dL    Toxicology Information SEE COMMENT         Radiologic Studies -    US Abdomen Limited   Final Result      Cholelithiasis along with a mildly thickened gallbladder wall.  The findings could represent acute cholecystitis in the appropriate clinical setting.      Small right renal cyst.      Solid mass of the left kidney as seen on the CT abdomen/pelvis performed on 05/11/2024.         Electronically signed by: Hemal Pappas MD   Date:    05/11/2024   Time:    13:23      CT Abdomen Pelvis With IV Contrast NO Oral Contrast   Final Result      Subtle hazy fat stranding along the gallbladder, raising the possibility of cholecystitis.  A follow-up gallbladder ultrasound could be obtained for further evaluation.      3.1 x 2.6 cm heterogeneously enhancing lesion at the inferior left kidney, concerning for renal cell carcinoma.  Urological consultation suggested.      Colonic diverticulosis.      Findings discussed with the ordering physician at the time of this dictation.         Electronically signed by: Hemal Pappas MD   Date:    05/11/2024   Time:    10:06           Medications given in the ED-   Medications   sodium chloride 0.9% bolus 1,000 mL 1,000 mL (0 mLs Intravenous Stopped 5/11/24 1134)   morphine injection 4 mg (4 mg Intravenous Given 5/11/24 0933)   dicyclomine injection 20 mg (20 mg Intramuscular Given 5/11/24 0933)   iohexoL (OMNIPAQUE 350) injection 100 mL (100 mLs Intravenous Given 5/11/24 0929)   ketorolac injection 15 mg (15 mg Intravenous Given 5/11/24 1020)           Medical Decision Making    I am the first provider for this patient.     I reviewed the vital signs, available nursing notes, past medical history, past surgical history, family history and social history.     Vital Signs:  Reviewed the patient's vital signs.     Pulse Oximetry Analysis and Interpretation:    100% on Room Air, normal        External Test Results  (Pertinent to encounter):    Records Reviewed: Nursing Notes, Old Medical Records, External Medical Records , and Previous Radiology Studies    History Obtained By: Patient    Provider Notes: Yasir Coe Jr. is a 43 y.o. male with generalized abdominal pain    Co-morbidities Considered:  none     Differential Diagnosis:  biliary colic , pancreatitis, gastritis, colitis, constipation      ED Course:    1:48 PM  Patient presents with generalized abdominal pain, tender parents epigastric and right upper quadrant.  Patient was given pain medication reported resolution of his symptoms.  Labs obtained which are unremarkable.  No leukocytosis.  Lipase not elevated.  CT was obtained which demonstrated subtle hazing fat stranding around gallbladder.  Follow-up ultrasound demonstrated cholelithiasis and mildly thickened wall.  Clinically patient does not appear to have acute cholecystitis.  He was afebrile in his symptoms have resolved.  Does report eating several brought worse last night which may be contributory towards his symptoms today.  Discussed with him at length symptom management and gallbladder diet.  Patient was incidentally noted to see a left renal mass on CT which was further characterized by ultrasound.  I discussed this diagnosis with him.  Discussed concern for possible renal cell carcinoma and how that would typically be managed.  Patient expressed understanding of this.  Discussed with him I have referred him to Nephrology as well as general surgery for both the gallbladder as well as this left renal lesion.  Patient understands the importance of following up to have this further evaluated as it may represent a probable renal malignancy.  Patient expressed understanding and agreement with plan.         Problems Addressed:  Cholelithiasis, renal mass    Procedures:   Procedures       Diagnosis and Disposition     Critical Care:      DISCHARGE NOTE:       Yasir Coe Jr.'s  results have been  reviewed with him.  He has been counseled regarding his diagnosis, treatment, and plan.  He verbally conveys understanding and agreement of the signs, symptoms, diagnosis, treatment and prognosis and additionally agrees to follow up as discussed.  He also agrees with the care-plan and conveys that all of his questions have been answered.  I have also provided discharge instructions for him that include: educational information regarding their diagnosis and treatment, and list of reasons why they would want to return to the ED prior to their follow-up appointment, should his condition change. He has been provided with education for proper emergency department utilization.         CLINICAL IMPRESSION:           1. Biliary colic    2. Calculus of gallbladder without cholecystitis without obstruction    3. Renal mass, left    4. Renal cyst, right              PLAN:   1. Discharge Home  2.      Medication List        START taking these medications      ondansetron 4 MG Tbdl  Commonly known as: ZOFRAN-ODT  Take 1 tablet (4 mg total) by mouth every 8 (eight) hours as needed ([place]a).            CHANGE how you take these medications      * naproxen 500 MG tablet  Commonly known as: NAPROSYN  Take 1 tablet (500 mg total) by mouth every 12 (twelve) hours as needed (Pain).  What changed: Another medication with the same name was added. Make sure you understand how and when to take each.     * naproxen 500 MG tablet  Commonly known as: NAPROSYN  Take 1 tablet (500 mg total) by mouth every 12 (twelve) hours as needed (Pain).  What changed: You were already taking a medication with the same name, and this prescription was added. Make sure you understand how and when to take each.           * This list has 2 medication(s) that are the same as other medications prescribed for you. Read the directions carefully, and ask your doctor or other care provider to review them with you.                ASK your doctor about these medications       ADVAIR DISKUS 250-50 mcg/dose diskus inhaler  Generic drug: fluticasone-salmeterol 250-50 mcg/dose  INHALE 1 PUFF INTO THE LUNGS 2 (TWO) TIMES DAILY. CONTROLLER     albuterol 90 mcg/actuation inhaler  Commonly known as: PROVENTIL/VENTOLIN HFA     ALPRAZolam 0.5 MG tablet  Commonly known as: XANAX     bacitracin 500 unit/gram Oint  Apply topically 2 (two) times daily.     buPROPion 300 MG 24 hr tablet  Commonly known as: WELLBUTRIN XL     busPIRone 15 MG tablet  Commonly known as: BUSPAR     butalbital-acetaminophen-caffeine -40 mg -40 mg per tablet  Commonly known as: FIORICET, ESGIC     montelukast 10 mg tablet  Commonly known as: SINGULAIR     omeprazole 40 MG capsule  Commonly known as: PRILOSEC  Take 1 capsule (40 mg total) by mouth once daily.     testosterone cypionate 200 mg/mL injection  Commonly known as: DEPOTESTOTERONE CYPIONATE     testosterone enanthate 200 mg/mL injection  Commonly known as: DELATESTRYL     traMADoL 50 mg tablet  Commonly known as: ULTRAM  Take 1 tablet (50 mg total) by mouth every 6 (six) hours as needed for Pain.               Where to Get Your Medications        These medications were sent to [facility]/pharmacy #[id number] - [place], [place] - [address]  [address] [address], [facility] [address]      Phone: [phone]   naproxen 500 MG tablet  ondansetron 4 MG Tbdl        3. Oli Hendrickson MD  [id number]  [place] [address]  [phone]    Schedule an appointment as soon as possible for a visit in 3 days      [facility] Emergency Department  [address] [id number] [phone]  Go to   If symptoms worsen    Kelly Saenz MD  [address]  [address]  [facility] [address]  [phone]    Schedule an appointment as soon as possible for a visit       Amber Stewart MD  [address] [facility]  [facility]  [facility] [id number] [phone]    Schedule an appointment as soon as possible for a visit           _______________________________     Please note that this dictation was completed with M*Modal, the computer voice recognition software.  Quite often unanticipated grammatical, syntax, homophones, and other interpretive errors are inadvertently transcribed by the computer software.  Please disregard these errors.  Please excuse any errors that have escaped final proofreading.             Clarence Thrasher MD  05/11/24 5307

## 2024-05-11 NOTE — DISCHARGE INSTRUCTIONS
Please follow-up with general surgery for your gallstones and renal lesion.  Please follow-up with nephrology for the lesion seen on your left kidney

## 2024-05-21 ENCOUNTER — TELEPHONE (OUTPATIENT)
Dept: SURGERY | Facility: CLINIC | Age: 44
End: 2024-05-21
Payer: COMMERCIAL

## 2024-05-21 NOTE — TELEPHONE ENCOUNTER
Called to schedule appt from referral that was received patient stated his gallbladder is fine and he is currently seeing urology for renal mass.

## 2024-06-07 ENCOUNTER — TELEPHONE (OUTPATIENT)
Dept: UROLOGY | Facility: CLINIC | Age: 44
End: 2024-06-07
Payer: COMMERCIAL

## 2024-06-07 NOTE — TELEPHONE ENCOUNTER
Spoke with patient who was able to provide acceptable patient identifiers prior to start of conversation.   Appointment with Dr Garnica scheduled with patient.  Location and directions provided.

## 2024-06-11 NOTE — PROGRESS NOTES
[facility]  Urologic Oncology Clinic Note        Date of Service: 6/13/2024        Chief Complaint/Reason for Consultation: Left Renal Mass , Right Renal Cyst    Requesting Provider:   Oli Hendrickson MD  [address] [address]      History of Present Illness:   Patient 44 y.o. male presents with incidental finding of left renal mass that is enhancing and concerning for malignancy on CT scan performed for epigastric pain.     He has no family hx of kidney cancer.    Takes testosterone once a week  Xanax    Otherwise healthy, active, no constitutional complaints. No hematuria.     Blood thinners:  None    No Hx of  TIA, CVA, MI    Abdominal surgeries:  None        Urologic History:     2/7/2024 -- CXR -- negative for obvious metastatic disease  5/11/2024 -- CT AP w/ contrast -- 3cm left renal mass concerning for malignancy, 3 cm simple right renal cyst         Review of patient's allergies indicates:  No Known Allergies     No past medical history on file.   No past surgical history on file.   No family history on file.   Social History     Tobacco Use    Smoking status: Never    Smokeless tobacco: Never   Substance Use Topics    Alcohol use: Not on file        Review of Systems   Constitutional:  Negative for activity change, fatigue and fever.   HENT:  Negative for congestion.    Respiratory:  Negative for cough.    Cardiovascular:  Negative for chest pain.   Genitourinary:  Negative for hematuria.             OBJECTIVE:     Vitals:    06/13/24 0911   BP: 120/75   Pulse: 66   Weight: 79.3 kg (174 lb 13.2 oz)   Height: 6' (1.829 m)          General Appearance: Alert, cooperative, no distress  Head: Normocephalic  Eyes: Clear conjunctiva  Neck: No obvious LND or JVD  Lungs: Normal chest excursion, no accessory muscle use  Chest: Regular rate rhythm by palpation, no pedal edema  Abdomen: Soft, non-tender, non-distended, surgical scars none, hernias none  Extremities: Atraumatic   Lymph  Nodes: No appreciable lymph adenopathy  Neurologic: No gross gait, motor or sensory deficits            LAB:        CMP  Sodium   Date Value Ref Range Status   05/11/2024 136 136 - 145 mmol/L Final     Potassium   Date Value Ref Range Status   05/11/2024 3.9 3.5 - 5.1 mmol/L Corrected     Comment:     Specimen slightly hemolyzed  CORRECTED RESULT; previously reported as 3.9 on 05/11/2024 at 10:09.       Chloride   Date Value Ref Range Status   05/11/2024 106 95 - 110 mmol/L Final     CO2   Date Value Ref Range Status   05/11/2024 26 23 - 29 mmol/L Final     Glucose   Date Value Ref Range Status   05/11/2024 107 70 - 110 mg/dL Final     BUN   Date Value Ref Range Status   05/11/2024 14 6 - 20 mg/dL Final     Creatinine   Date Value Ref Range Status   05/11/2024 1.2 0.5 - 1.4 mg/dL Final     Calcium   Date Value Ref Range Status   05/11/2024 9.1 8.7 - 10.5 mg/dL Final     Total Protein   Date Value Ref Range Status   05/11/2024 7.4 6.0 - 8.4 g/dL Final     Albumin   Date Value Ref Range Status   05/11/2024 3.6 3.5 - 5.2 g/dL Final     Total Bilirubin   Date Value Ref Range Status   05/11/2024 0.3 0.1 - 1.0 mg/dL Final     Comment:     For infants and newborns, interpretation of results should be based  on gestational age, weight and in agreement with clinical  observations.    Premature Infant recommended reference ranges:  Up to 24 hours.............<8.0 mg/dL  Up to 48 hours............<12.0 mg/dL  3-5 days..................<15.0 mg/dL  6-29 days.................<15.0 mg/dL    For patients on Eltrombopag therapy, use of Dimension [place] TBIL is   not   recommended.       Alkaline Phosphatase   Date Value Ref Range Status   05/11/2024 54 (L) 55 - 135 U/L Final     AST   Date Value Ref Range Status   05/11/2024 24 10 - 40 U/L Final     ALT   Date Value Ref Range Status   05/11/2024 28 10 - 44 U/L Final     Anion Gap   Date Value Ref Range Status   05/11/2024 4 3 - 11 mmol/L Final     eGFR   Date Value Ref Range Status    2024 >60.0 >60 mL/min/1.73 m^2 Final     Lab Results   Component Value Date    WBC 7.03 2024    HGB 16.6 2024    HCT 49.4 2024    MCV 91 2024     2024           IMAGIN/11/2024  CT ABDOMEN PELVIS WITH IV CONTRAST     CLINICAL HISTORY:  Abdominal pain, acute, nonlocalized;     CT/nuclear cardiac exams in previous 12 months: None     TECHNIQUE:  Axial CT images were obtained and evaluated with coronal reformatted images.  Iterative reconstruction technique was used.     COMPARISON:  CT abdomen/pelvis 2023     FINDINGS:  There is a small calcified granuloma within the visualized right lung.  No abnormality identified of the liver, pancreas, spleen or adrenal glands.  Kidneys enhance symmetrically.  The right kidney contain cysts, the largest measuring 3.9 cm.  There is a 3.1 x 2.6 cm heterogeneously enhancing lesion at the inferior left kidney, concerning for renal cell carcinoma.  Subtle hazy fat stranding is seen along the gallbladder, raising the possibility of cholecystitis.  There is colonic diverticulosis.  No evidence of bowel obstruction or appendicitis.  Urinary bladder is unremarkable.  There is a small fat containing umbilical hernia.     Impression:     Subtle hazy fat stranding along the gallbladder, raising the possibility of cholecystitis.  A follow-up gallbladder ultrasound could be obtained for further evaluation.     3.1 x 2.6 cm heterogeneously enhancing lesion at the inferior left kidney, concerning for renal cell carcinoma.  Urological consultation suggested.     Colonic diverticulosis.     Findings discussed with the ordering physician at the time of this dictation.        Electronically signed by:Hemal Pappas MD  Date:                                            2024  Time:                                           10:06        ASSESSMENT/PLAN:     43 yo M presents with history of right renal cyst and left renal mass.     Plan:    The  patient presents today for discussion and management of a left renal mass. Given the size of the mass and its appearance on imaging, we estimate its probability of being malignant to be 80%.     Today I reviewed with the patient his outside medical records, imaging studies, previous medical history, and laboratory data.       We discussed the role of additional studies to give further resolution as to the nature of this mass.  One approach would be renal mass biopsy.  This would be done with image guidance by our radiologist and would hopefully give direct pathologic assessment of the mass.  The risk of tumor seeding is thought to be low from this with modern techniques.  However there is an ~10 to 15% chance of a nondiagnostic biopsy, particularly in the setting of small renal masses.  There are also procedural risks associated with the biopsy.  The decision to proceed with a biopsy would be relevant in the case where a patient is not comfortable with active surveillance without knowing the pathology of the mass.     We discussed that in general there are three broad management options for small renal masses:         1) Active surveillance     Active surveillance implies no treatment for curative intent but the use of careful surveillance to monitor the lesion for growth.  The probability of metastasis developing while on surveillance of a small renal lesion is felt to be very low (1-3%), but is not impossible. The average growth rate for small renal tumors is 2-4 mm per year. We generally recommend imaging annually or every other year while on surveillance.  In some instances we consider obtaining a tumor biopsy prior to performing active surveillance, though this is not a universal practice.     Do not think this is a reasonable option for patient as mass is concerning for malignancy and he is of young age. Will need genetic testing.       2) Percutaneous ablation      Percutaneous ablation involves the placement  of a needle within the lesion and the use of radiofrequency heating or cryoablation cooling to destroy the tumor.  Generally a biopsy of the tumor is obtained at the time of ablation to document histology.  At [facility] our preference is generally in favor of cryoablation because we can image the ice ball forming better and because efficacy seems better with larger tumors.  Retrospective studies showed that cryoablation is only slightly less efficacious than surgery, but has the advantage of less treatment related morbidity.  In most cases the procedure can be performed as an outpatient.  We explained that this is done by an interventional radiologist at our institution and that post-procedure imaging follow-up is organized by the urology service and continues for about 5 years to ensure the absence of a tumor recurrence.     Think this option could be undertaken but may require two separate ablations. This option was offered to patient.        3) Surgical removal      Surgery is another way to treat small renal masses.  In general, two forms of surgery are possible, radical nephrectomy and partial nephrectomy.  For most small renal masses, we recommend partial nephrectomy because it allows preservation of renal function from the affected kidney.  However, in patients with kidneys that are poorly functional, in tumors that are invading critical structures, or in situations where the kidney tumor is located at an anatomical site within the kidney that makes renal reconstruction impossible, then radical nephrectomy may be required.  We discussed that surgery could be performed using an open technique or minimally invasive technique which could be either laparoscopic or robotic.  The choice of each technique depends on the patient's prior surgical history, body habitus, and location and size of the tumor. The patient is a GOOD candidate for a robotic partial nephrectomy. The pros and cons of these different techniques  were discussed.  I explained that, occasionally, we may have to place a ureteral stent and maintain a urethral catheter to optimize urine drainage away from the site of reconstruction depending on the tumor location. The perioperative details of surgery and the postoperative recovery were discussed.  Potential risks of surgery were discussed, which include but are not limited to risks of anesthesia, bleeding, infection, adjacent organ injury, renal dysfunction, urine leak, medical complications of surgery, and death (although this is rare in modern partial nephrectomy).         The patient asked questions and all of them were answered.  After this discussion the patient consented to robotic partial nephrectomy.     CXR 2 view  UA + urine culture   Will schedule for surgery on 7/3/2024      Giancarlo Garnica MD  Urologic Oncology  P: [phone]

## 2024-06-13 ENCOUNTER — OFFICE VISIT (OUTPATIENT)
Dept: UROLOGY | Facility: CLINIC | Age: 44
End: 2024-06-13
Payer: COMMERCIAL

## 2024-06-13 ENCOUNTER — HOSPITAL ENCOUNTER (OUTPATIENT)
Dept: RADIOLOGY | Facility: HOSPITAL | Age: 44
Discharge: HOME | End: 2024-06-13
Attending: STUDENT IN AN ORGANIZED HEALTH CARE EDUCATION/TRAINING PROGRAM
Payer: COMMERCIAL

## 2024-06-13 VITALS
HEIGHT: 72 IN | DIASTOLIC BLOOD PRESSURE: 75 MMHG | HEART RATE: 66 BPM | BODY MASS INDEX: 23.68 KG/M2 | WEIGHT: 174.81 LBS | SYSTOLIC BLOOD PRESSURE: 120 MMHG

## 2024-06-13 DIAGNOSIS — N28.89 LEFT RENAL MASS: Primary | ICD-10-CM

## 2024-06-13 DIAGNOSIS — N28.89 LEFT RENAL MASS: ICD-10-CM

## 2024-06-13 LAB
BILIRUB UR QL STRIP: NEGATIVE
CLARITY UR REFRACT.AUTO: CLEAR
COLOR UR AUTO: COLORLESS
GLUCOSE UR QL STRIP: NEGATIVE
HGB UR QL STRIP: NEGATIVE
KETONES UR QL STRIP: NEGATIVE
LEUKOCYTE ESTERASE UR QL STRIP: NEGATIVE
NITRITE UR QL STRIP: NEGATIVE
PH UR STRIP: 7 [PH] (ref 5–8)
PROT UR QL STRIP: NEGATIVE
SP GR UR STRIP: 1.01 (ref 1–1.03)
URN SPEC COLLECT METH UR: ABNORMAL

## 2024-06-13 PROCEDURE — 87086 URINE CULTURE/COLONY COUNT: CPT | Performed by: STUDENT IN AN ORGANIZED HEALTH CARE EDUCATION/TRAINING PROGRAM

## 2024-06-13 PROCEDURE — 71046 X-RAY EXAM CHEST 2 VIEWS: CPT | Mod: 26,,, | Performed by: RADIOLOGY

## 2024-06-13 PROCEDURE — 99999 PR PBB SHADOW E&M-EST. PATIENT-LVL IV: CPT | Mod: PBBFAC,,, | Performed by: STUDENT IN AN ORGANIZED HEALTH CARE EDUCATION/TRAINING PROGRAM

## 2024-06-13 PROCEDURE — 71046 X-RAY EXAM CHEST 2 VIEWS: CPT | Mod: TC

## 2024-06-13 PROCEDURE — 81003 URINALYSIS AUTO W/O SCOPE: CPT | Performed by: STUDENT IN AN ORGANIZED HEALTH CARE EDUCATION/TRAINING PROGRAM

## 2024-06-13 PROCEDURE — 99205 OFFICE O/P NEW HI 60 MIN: CPT | Mod: S$GLB,,, | Performed by: STUDENT IN AN ORGANIZED HEALTH CARE EDUCATION/TRAINING PROGRAM

## 2024-06-14 ENCOUNTER — TELEPHONE (OUTPATIENT)
Dept: PREADMISSION TESTING | Facility: HOSPITAL | Age: 44
End: 2024-06-14
Payer: COMMERCIAL

## 2024-06-14 LAB — BACTERIA UR CULT: NO GROWTH

## 2024-06-14 NOTE — ANESTHESIA PAT ROS NOTE
06/14/2024  Yasir Coe Jr. is a 44 y.o., male.      Pre-op Assessment          Review of Systems           Anesthesia Assessment: Preoperative EQUATION    Planned Procedure: Procedure(s) (LRB):  DV5 ROBOTIC NEPHRECTOMY PARTIAL (Left)  Requested Anesthesia Type:General/Regional  Surgeon: Giancarlo Garnica MD  Service: Urology  Known or anticipated Date of Surgery:7/3/2024    Surgeon notes: reviewed    Previous anesthesia records:Not available    Last PCP note: > 1 year ago , outside [facility]   Subspecialty notes: Pulmonary, Urology    Other important co-morbidities: GERD, SHIRA, and Marijuana use, Asthma       Tests already available:  Available tests,  within 3 months , within [facility] .   5/11/2024 CBC    Optimization:  Anesthesia Preop Clinic Assessment  Indicated.    Medical Opinion Indicated.          Plan:    Testing:  CMP and T&S   Pre-anesthesia  visit       Visit focus: concerns in complex and/or prolonged anesthesia, position other than supine     Consultation:IM Perioperative Hospitalist     Patient  has previously scheduled Medical Appointment: N/A    Navigation: Tests Scheduled.              Consults scheduled.             Results will be tracked by Preop Clinic.

## 2024-06-14 NOTE — TELEPHONE ENCOUNTER
----- Message from Mirian Hatch RN sent at 6/14/2024  1:58 PM CDT -----  Surgery date: 7/3/2024  PreOp appt:  N/A  Surgeon: Danika    Please call Pt and schedule the following preop appts:    NP  Lab    Thank you!  Mirian

## 2024-06-20 ENCOUNTER — TELEPHONE (OUTPATIENT)
Dept: INTERNAL MEDICINE | Facility: CLINIC | Age: 44
End: 2024-06-20
Payer: COMMERCIAL

## 2024-06-20 PROBLEM — J45.20 MILD INTERMITTENT ASTHMA WITHOUT COMPLICATION: Status: ACTIVE | Noted: 2024-06-20

## 2024-06-20 PROBLEM — K21.9 GERD (GASTROESOPHAGEAL REFLUX DISEASE): Status: ACTIVE | Noted: 2024-06-20

## 2024-06-20 PROBLEM — N28.89 LEFT RENAL MASS: Status: ACTIVE | Noted: 2024-06-20

## 2024-06-20 PROBLEM — G47.33 OSA (OBSTRUCTIVE SLEEP APNEA): Status: ACTIVE | Noted: 2024-06-20

## 2024-06-20 NOTE — HPI
This is a 44 y.o. male  who presents today for a preoperative evaluation in preparation for left partial robotic nephrectomy.  Surgery is indicated for left renal mass.   Patient is new to me.  The history has been obtained by speaking with the patient and reviewing the electronic medical record and/or outside health information. Significant health conditions for the perioperative period are discussed below in assessment and plan.   Patient reports current health status to be Good.  Denies any new symptoms before surgery.

## 2024-06-20 NOTE — OUTPATIENT SUBJECTIVE & OBJECTIVE
Outpatient Subjective & Objective      Chief Complaint: Preoperative evaulation, perioperative medical management, and complication reduction plan.     Functional Capacity: active while working as an ; can walk up multiple flights of stairs without CP/SOB.        Anesthesia issues: None    Difficulty mouth opening: No    Steroid use in the last 12 months:  No    Dental Issues: None    Family anesthesia difficulty: None     Family Hx of Thrombosis: None    Past Medical History:   Diagnosis Date    Anxiety     GERD (gastroesophageal reflux disease)     dietary changes improved         Past Medical History Pertinent Negatives:   Diagnosis Date Noted    COPD (chronic obstructive pulmonary disease) 06/21/2024    Coronary artery disease 06/21/2024    Deep vein thrombosis 06/21/2024    Depression 06/21/2024    Diabetes mellitus, type 2 06/21/2024    Disorder of kidney and ureter 06/21/2024    Hyperlipidemia 06/21/2024    Hypertension 06/21/2024    Pulmonary embolism 06/21/2024    Seizures 06/21/2024    Stroke 06/21/2024    Thyroid disease 06/21/2024         History reviewed. No pertinent surgical history.    Review of Systems   Constitutional:  Negative for chills, fatigue, fever and unexpected weight change.   HENT:  Negative for congestion, hearing loss, rhinorrhea, sore throat, tinnitus and trouble swallowing.    Eyes:  Negative for visual disturbance.   Respiratory:  Negative for cough, chest tightness, shortness of breath and wheezing.    Cardiovascular:  Negative for chest pain, palpitations and leg swelling.   Gastrointestinal:  Negative for constipation and diarrhea.        Denies Fatty liver, Hepatitis   Genitourinary:  Negative for decreased urine volume, difficulty urinating, dysuria, frequency, hematuria and urgency.   Musculoskeletal:  Negative for arthralgias, back pain, neck pain and neck stiffness.   Skin:  Negative for rash and wound.   Neurological:  Negative for dizziness, syncope, weakness,  numbness and headaches.   Hematological:  Does not bruise/bleed easily.   Psychiatric/Behavioral:  Negative for sleep disturbance and suicidal ideas.               VITALS  Visit Vitals  /88 (BP Location: Left arm, Patient Position: Sitting)   Pulse 66   Temp 97.9 °F (36.6 °C) (Oral)   Ht 6' (1.829 m)   Wt 79.1 kg (174 lb 6.1 oz)   SpO2 98%   BMI 23.65 kg/m²          Physical Exam  Vitals reviewed.   Constitutional:       General: He is not in acute distress.     Appearance: He is well-developed.   HENT:      Head: Normocephalic.      Nose: Nose normal.      Mouth/Throat:      Pharynx: No oropharyngeal exudate.   Eyes:      General:         Right eye: No discharge.         Left eye: No discharge.      Conjunctiva/sclera: Conjunctivae normal.      Pupils: Pupils are equal, round, and reactive to light.   Neck:      Thyroid: No thyromegaly.      Vascular: No carotid bruit or JVD.      Trachea: No tracheal deviation.   Cardiovascular:      Rate and Rhythm: Normal rate and regular rhythm.      Pulses:           Carotid pulses are 2+ on the right side and 2+ on the left side.       Dorsalis pedis pulses are 2+ on the right side and 2+ on the left side.        Posterior tibial pulses are 2+ on the right side and 2+ on the left side.      Heart sounds: Normal heart sounds. No murmur heard.  Pulmonary:      Effort: Pulmonary effort is normal. No respiratory distress.      Breath sounds: Normal breath sounds. No stridor. No wheezing, rhonchi or rales.   Abdominal:      General: Bowel sounds are normal. There is no distension.      Palpations: Abdomen is soft.      Tenderness: There is no abdominal tenderness. There is no guarding.   Musculoskeletal:      Cervical back: Normal range of motion. No pain with movement.      Right lower leg: No edema.      Left lower leg: No edema.   Lymphadenopathy:      Cervical: No cervical adenopathy.   Skin:     General: Skin is warm and dry.      Capillary Refill: Capillary refill  takes less than 2 seconds.      Findings: No erythema or rash.   Neurological:      Mental Status: He is alert and oriented to person, place, and time.   Psychiatric:         Behavior: Behavior normal.          Significant Labs:  Lab Results   Component Value Date    WBC 7.03 05/11/2024    HGB 16.6 05/11/2024    HCT 49.4 05/11/2024     05/11/2024    ALT 28 05/11/2024    AST 24 05/11/2024     05/11/2024    K 3.9 05/11/2024     05/11/2024    CREATININE 1.2 05/11/2024    BUN 14 05/11/2024    CO2 26 05/11/2024    TSH 2.070 04/01/2021    PSA 0.59 04/01/2021           EKG:   Results for orders placed or performed in visit on 10/30/21   EKG 12-lead    Collection Time: 10/30/21  7:15 PM    Narrative    Test Reason : R07.9    Vent. Rate : 097 BPM     Atrial Rate : 097 BPM     P-R Int : 130 ms          QRS Dur : 082 ms      QT Int : 314 ms       P-R-T Axes : 073 079 055 degrees     QTc Int : 398 ms    Normal sinus rhythm  Normal ECG  When compared with ECG of 21-OCT-2021 19:27,  No significant change was found  Confirmed by Ryland Jeff MD ([id number]) on 10/31/2021 1:26:57 PM    Referred By: TONY GARDNER MD           Confirmed By:Ryland Jeff MD             Active Cardiac Conditions: None      Revised Cardiac Risk Index   High -Risk Surgery  Intraperitoneal; Intrathoracic; suprainguinal vascular Yes- + 1 No- 0   History of Ischemic Heart Disease   (Hx of MI/positive exercise test/current chest pain due to ischemia/use of nitrate therapy/EKG with pathological Q waves) Yes- + 1 No- 0   History of CHF  (Pulmonary edema/bilateral rales or S3 gallop/PND/CXR showing pulmonary vascular redistribution) Yes- + 1 No- 0   History of CVA   (Prior stroke or TIA) Yes- + 1 No- 0   Pre-operative treatment with insulin Yes- + 1 No- 0   Pre-operative creatinine > 2mg/dl Yes- + 1 No- 0   Total: 0      Risk Status:  Estimated risk of cardiac complications after non-cardiac surgery using the Revised Cardiac Risk Index for  Preoperative risk is 3.9 %      ARISCAT ([place]) risk index: Low: 1.6% risk of post-op pulmonary complications.    American Society of Anesthesiologists Physical Status classification (ASA): 2             Outpatient Subjective & Objective

## 2024-06-20 NOTE — PROGRESS NOTES
[facility]  Progress Note    Patient Name: Yasir Coe Jr.  MRN: [id number]  Date of Evaluation- 06/21/2024  PCP- Oli Hendrickson MD    Future cases for Yasir Coe Jr, Jr. [[id number]]       Case ID Status Date Time Jeremiah Procedure Provider Location    [id number] [facility] 7/3/2024  8:00  DV5 ROBOTIC NEPHRECTOMY PARTIAL Giancarlo Garnica MD [[id number]] [facility] OR 2ND FLR            HPI:  This is a 44 y.o. male  who presents today for a preoperative evaluation in preparation for left partial robotic nephrectomy.  Surgery is indicated for left renal mass.   Patient is new to me.  The history has been obtained by speaking with the patient and reviewing the electronic medical record and/or outside health information. Significant health conditions for the perioperative period are discussed below in assessment and plan.   Patient reports current health status to be Good.  Denies any new symptoms before surgery.       Subjective/ Objective:     Chief Complaint: Preoperative evaulation, perioperative medical management, and complication reduction plan.     Functional Capacity: active while working as an ; can walk up multiple flights of stairs without CP/SOB.        Anesthesia issues: None    Difficulty mouth opening: No    Steroid use in the last 12 months:  No    Dental Issues: None    Family anesthesia difficulty: None     Family Hx of Thrombosis: None    Past Medical History:   Diagnosis Date    Anxiety     GERD (gastroesophageal reflux disease)     dietary changes improved         Past Medical History Pertinent Negatives:   Diagnosis Date Noted    COPD (chronic obstructive pulmonary disease) 06/21/2024    Coronary artery disease 06/21/2024    Deep vein thrombosis 06/21/2024    Depression 06/21/2024    Diabetes mellitus, type 2 06/21/2024    Disorder of kidney and ureter 06/21/2024    Hyperlipidemia 06/21/2024    Hypertension 06/21/2024    Pulmonary embolism 06/21/2024    Seizures 06/21/2024     Stroke 06/21/2024    Thyroid disease 06/21/2024         History reviewed. No pertinent surgical history.    Review of Systems   Constitutional:  Negative for chills, fatigue, fever and unexpected weight change.   HENT:  Negative for congestion, hearing loss, rhinorrhea, sore throat, tinnitus and trouble swallowing.    Eyes:  Negative for visual disturbance.   Respiratory:  Negative for cough, chest tightness, shortness of breath and wheezing.    Cardiovascular:  Negative for chest pain, palpitations and leg swelling.   Gastrointestinal:  Negative for constipation and diarrhea.        Denies Fatty liver, Hepatitis   Genitourinary:  Negative for decreased urine volume, difficulty urinating, dysuria, frequency, hematuria and urgency.   Musculoskeletal:  Negative for arthralgias, back pain, neck pain and neck stiffness.   Skin:  Negative for rash and wound.   Neurological:  Negative for dizziness, syncope, weakness, numbness and headaches.   Hematological:  Does not bruise/bleed easily.   Psychiatric/Behavioral:  Negative for sleep disturbance and suicidal ideas.               VITALS  Visit Vitals  /88 (BP Location: Left arm, Patient Position: Sitting)   Pulse 66   Temp 97.9 °F (36.6 °C) (Oral)   Ht 6' (1.829 m)   Wt 79.1 kg (174 lb 6.1 oz)   SpO2 98%   BMI 23.65 kg/m²          Physical Exam  Vitals reviewed.   Constitutional:       General: He is not in acute distress.     Appearance: He is well-developed.   HENT:      Head: Normocephalic.      Nose: Nose normal.      Mouth/Throat:      Pharynx: No oropharyngeal exudate.   Eyes:      General:         Right eye: No discharge.         Left eye: No discharge.      Conjunctiva/sclera: Conjunctivae normal.      Pupils: Pupils are equal, round, and reactive to light.   Neck:      Thyroid: No thyromegaly.      Vascular: No carotid bruit or JVD.      Trachea: No tracheal deviation.   Cardiovascular:      Rate and Rhythm: Normal rate and regular rhythm.      Pulses:            Carotid pulses are 2+ on the right side and 2+ on the left side.       Dorsalis pedis pulses are 2+ on the right side and 2+ on the left side.        Posterior tibial pulses are 2+ on the right side and 2+ on the left side.      Heart sounds: Normal heart sounds. No murmur heard.  Pulmonary:      Effort: Pulmonary effort is normal. No respiratory distress.      Breath sounds: Normal breath sounds. No stridor. No wheezing, rhonchi or rales.   Abdominal:      General: Bowel sounds are normal. There is no distension.      Palpations: Abdomen is soft.      Tenderness: There is no abdominal tenderness. There is no guarding.   Musculoskeletal:      Cervical back: Normal range of motion. No pain with movement.      Right lower leg: No edema.      Left lower leg: No edema.   Lymphadenopathy:      Cervical: No cervical adenopathy.   Skin:     General: Skin is warm and dry.      Capillary Refill: Capillary refill takes less than 2 seconds.      Findings: No erythema or rash.   Neurological:      Mental Status: He is alert and oriented to person, place, and time.   Psychiatric:         Behavior: Behavior normal.          Significant Labs:  Lab Results   Component Value Date    WBC 7.03 05/11/2024    HGB 16.6 05/11/2024    HCT 49.4 05/11/2024     05/11/2024    ALT 28 05/11/2024    AST 24 05/11/2024     05/11/2024    K 3.9 05/11/2024     05/11/2024    CREATININE 1.2 05/11/2024    BUN 14 05/11/2024    CO2 26 05/11/2024    TSH 2.070 04/01/2021    PSA 0.59 04/01/2021           EKG:   Results for orders placed or performed in visit on 10/30/21   EKG 12-lead    Collection Time: 10/30/21  7:15 PM    Narrative    Test Reason : R07.9    Vent. Rate : 097 BPM     Atrial Rate : 097 BPM     P-R Int : 130 ms          QRS Dur : 082 ms      QT Int : 314 ms       P-R-T Axes : 073 079 055 degrees     QTc Int : 398 ms    Normal sinus rhythm  Normal ECG  When compared with ECG of 21-OCT-2021 19:27,  No significant change was  found  Confirmed by Ryland Jeff MD ([id number]) on 10/31/2021 1:26:57 PM    Referred By: TONY GARDNER MD           Confirmed By:Ryland Jeff MD             Active Cardiac Conditions: None      Revised Cardiac Risk Index   High -Risk Surgery  Intraperitoneal; Intrathoracic; suprainguinal vascular Yes- + 1 No- 0   History of Ischemic Heart Disease   (Hx of MI/positive exercise test/current chest pain due to ischemia/use of nitrate therapy/EKG with pathological Q waves) Yes- + 1 No- 0   History of CHF  (Pulmonary edema/bilateral rales or S3 gallop/PND/CXR showing pulmonary vascular redistribution) Yes- + 1 No- 0   History of CVA   (Prior stroke or TIA) Yes- + 1 No- 0   Pre-operative treatment with insulin Yes- + 1 No- 0   Pre-operative creatinine > 2mg/dl Yes- + 1 No- 0   Total: 0      Risk Status:  Estimated risk of cardiac complications after non-cardiac surgery using the Revised Cardiac Risk Index for Preoperative risk is 3.9 %      ARISCAT (Canet) risk index: Low: 1.6% risk of post-op pulmonary complications.    American Society of Anesthesiologists Physical Status classification (ASA): 2                   Orders Placed This Encounter    PROSTATE SPECIFIC ANTIGEN, DIAGNOSTIC           Assessment/Plan:     Left renal mass  Scheduled with Dr. Garnica on 7/3/24 for robotic partial nephrectomy.    Mild intermittent asthma without complication  Not currently treated  Denies diagnosis- attributes previous inhaler use to  multiple Covid infections  Denies hospitalizations/intubations  Sats today: 98%      I recommend possible use of inhaled bronchodilators if patient develops perioperative bronchospasm.        SHIRA (obstructive sleep apnea)  Not prescribed CPAP machine    Recommend caution with sedating medication that can cause respiratory depression.   Patients with untreated SHIRA have an increased risk of stroke, HTN, and heart disease.      Anxiety  Treated with: Xanax prn ; controlled symptoms.   Denies suicidal/  homicidal ideations.       Current use of testosterone replacement therapy  No recent testosterone level   Hct level: 49.4  Last PSA level: pending (patient requested level)  Managed per: PCP        Discussion/Management of Perioperative Care    Thromboembolic prophylaxis (VTE) Care: Risk factors for thrombosis include: hormone replacement therapy and surgical procedure.  I recommend prophylaxis of thromboembolism with the use of compression stockings/pneumatic devices, and/or pharmacologic agents. The benefits should outweigh the risks for pharmacologic prophylaxis in the perioperative period. I also encourage early ambulation if not contraindicated during the post-operative period.    Risk factors for post-operative pulmonary complications include:surgery > 3 hours. To reduce the risk of pulmonary complications, prophylactic recommendations include: incentive spirometry use/deep breathing, end tidal carbon dioxide monitoring, and pain control.    To reduce the risk of postoperative renal complications, I recommend the patient maintain adequate hydration.  Avoid/reduce NSAIDS and PALOMO-2 inhibitors use as well as IV contrast for renal protection.    I recommend the use of appropriate prophylactic antibiotics to reduce the risk of surgical site infections.    The patient is at an increased risk for urinary retention due to : urological procedure. I recommend to avoid/decrease the use of benzodiazepines, anticholinergics, and Benadryl in the perioperative period. I also recommend using opioids for the shortest period of time if possible.          This visit was focused on Preoperative evaluation, Perioperative Medical management, complication reduction plans. I suggest that the patient follows up with primary care or relevant sub specialists for ongoing health care.    I appreciate the opportunity to be involved in this patients care. Please feel free to contact me if there were any questions about this  consultation.      I spent a total of 62 minutes on the day of the visit.This includes face to face time and non-face to face time preparing to see the patient (e.g., review of tests), obtaining and/or reviewing separately obtained history, documenting clinical information in the electronic or other health record, independently interpreting results and communicating results to the patient/family/caregiver, or care coordinator.       Patient is pending optimization BMP      Blossom Garcia NP  Perioperative Medicine  [facility]

## 2024-06-20 NOTE — ASSESSMENT & PLAN NOTE
Not prescribed CPAP machine    Recommend caution with sedating medication that can cause respiratory depression.   Patients with untreated SHIRA have an increased risk of stroke, HTN, and heart disease.

## 2024-06-20 NOTE — ASSESSMENT & PLAN NOTE
Not currently treated  Denies diagnosis- attributes previous inhaler use to  multiple Covid infections  Denies hospitalizations/intubations  Sats today: 98%      I recommend possible use of inhaled bronchodilators if patient develops perioperative bronchospasm.

## 2024-06-21 ENCOUNTER — OFFICE VISIT (OUTPATIENT)
Dept: INTERNAL MEDICINE | Facility: CLINIC | Age: 44
End: 2024-06-21
Payer: COMMERCIAL

## 2024-06-21 ENCOUNTER — RESEARCH ENCOUNTER (OUTPATIENT)
Dept: RESEARCH | Facility: HOSPITAL | Age: 44
End: 2024-06-21
Payer: COMMERCIAL

## 2024-06-21 ENCOUNTER — OFFICE VISIT (OUTPATIENT)
Dept: UROLOGY | Facility: CLINIC | Age: 44
End: 2024-06-21
Payer: COMMERCIAL

## 2024-06-21 VITALS
BODY MASS INDEX: 23.62 KG/M2 | HEIGHT: 72 IN | HEART RATE: 66 BPM | WEIGHT: 174.38 LBS | TEMPERATURE: 98 F | SYSTOLIC BLOOD PRESSURE: 124 MMHG | OXYGEN SATURATION: 98 % | DIASTOLIC BLOOD PRESSURE: 88 MMHG

## 2024-06-21 VITALS — DIASTOLIC BLOOD PRESSURE: 78 MMHG | RESPIRATION RATE: 16 BRPM | HEART RATE: 58 BPM | SYSTOLIC BLOOD PRESSURE: 144 MMHG

## 2024-06-21 DIAGNOSIS — N28.89 LEFT RENAL MASS: ICD-10-CM

## 2024-06-21 DIAGNOSIS — J45.20 MILD INTERMITTENT ASTHMA WITHOUT COMPLICATION: ICD-10-CM

## 2024-06-21 DIAGNOSIS — Z01.818 PREOPERATIVE EXAMINATION: Primary | ICD-10-CM

## 2024-06-21 DIAGNOSIS — F41.9 ANXIETY: ICD-10-CM

## 2024-06-21 DIAGNOSIS — Z79.890 LONG-TERM CURRENT USE OF TESTOSTERONE REPLACEMENT THERAPY: ICD-10-CM

## 2024-06-21 DIAGNOSIS — E29.1 TESTOSTERONE DEFICIENCY IN MALE: ICD-10-CM

## 2024-06-21 DIAGNOSIS — T38.7X1D: ICD-10-CM

## 2024-06-21 DIAGNOSIS — N28.89 LEFT RENAL MASS: Primary | ICD-10-CM

## 2024-06-21 DIAGNOSIS — G47.33 OSA (OBSTRUCTIVE SLEEP APNEA): ICD-10-CM

## 2024-06-21 DIAGNOSIS — K21.9 GASTROESOPHAGEAL REFLUX DISEASE, UNSPECIFIED WHETHER ESOPHAGITIS PRESENT: ICD-10-CM

## 2024-06-21 PROCEDURE — 99999 PR PBB SHADOW E&M-EST. PATIENT-LVL II: CPT | Mod: PBBFAC,,, | Performed by: STUDENT IN AN ORGANIZED HEALTH CARE EDUCATION/TRAINING PROGRAM

## 2024-06-21 PROCEDURE — 87086 URINE CULTURE/COLONY COUNT: CPT

## 2024-06-21 PROCEDURE — 99999 PR PBB SHADOW E&M-EST. PATIENT-LVL III: CPT | Mod: PBBFAC,,, | Performed by: NURSE PRACTITIONER

## 2024-06-21 NOTE — H&P (VIEW-ONLY)
Urology ([facility]) H&P for upcoming procedure  Staff:  Giancarlo Garnica MD    CC: Left Renal Mass    HPI:    Patient 44 y.o. male presents with incidental finding of left renal mass that is enhancing and concerning for malignancy on CT scan performed for epigastric pain.      He has no family hx of kidney cancer.     Takes testosterone once a week  Xanax     Otherwise healthy, active, no constitutional complaints. No hematuria.      Blood thinners:  None    No Hx of  TIA, CVA, MI    Abdominal surgeries:  None          Urologic History:      2/7/2024 -- CXR -- negative for obvious metastatic disease  5/11/2024 -- CT AP w/ contrast -- 3cm left renal mass concerning for malignancy, 3 cm simple right renal cyst    ROS: Negative except for as stated above    Past Medical History:   Diagnosis Date    Anxiety     GERD (gastroesophageal reflux disease)     improved with dietary changes       Past Surgical History:   Procedure Laterality Date    FRACTURE SURGERY      bilateral arm       Social History     Socioeconomic History    Marital status: Single   Tobacco Use    Smoking status: Never    Smokeless tobacco: Never   Substance and Sexual Activity    Alcohol use: Yes     Alcohol/week: 3.0 - 6.0 standard drinks of alcohol     Types: 3 - 6 Shots of liquor per week    Drug use: Not Currently     Types: Marijuana     Social Determinants of Health     Financial Resource Strain: Medium Risk (6/10/2024)    Overall Financial Resource Strain (CARDIA)     Difficulty of Paying Living Expenses: Somewhat hard   Food Insecurity: No Food Insecurity (6/10/2024)    Hunger Vital Sign     Worried About Running Out of Food in the Last Year: Never true     Ran Out of Food in the Last Year: Never true   Physical Activity: Insufficiently Active (6/10/2024)    Exercise Vital Sign     Days of Exercise per Week: 3 days     Minutes of Exercise per Session: 30 min   Stress: No Stress Concern Present (6/10/2024)    [employer] [place] [employer] - Occupational Stress Questionnaire     Feeling of Stress : Only a little   Housing Stability: Unknown (6/10/2024)    Housing Stability Vital Sign     Unable to Pay for Housing in the Last Year: No       Family History   Problem Relation Name Age of Onset    No Known Problems Mother      Heart disease Father      No Known Problems Sister         Review of patient's allergies indicates:  No Known Allergies    Current Outpatient Medications on File Prior to Visit   Medication Sig Dispense Refill    ALPRAZolam (XANAX) 0.5 MG tablet Take 0.5 mg by mouth nightly as needed for Anxiety.      bacitracin 500 unit/gram Oint Apply topically 2 (two) times daily. (Patient taking differently: Apply topically 2 (two) times daily as needed.) 30 g 0    testosterone cypionate (DEPOTESTOTERONE CYPIONATE) 200 mg/mL injection       testosterone enanthate (DELATESTRYL) 200 mg/mL injection Inject into the muscle every 14 (fourteen) days. Pt takes testosterone 2 x's a week      [DISCONTINUED] ADVAIR DISKUS 250-50 mcg/dose diskus inhaler INHALE 1 PUFF INTO THE LUNGS 2 (TWO) TIMES DAILY. CONTROLLER (Patient not taking: Reported on 6/21/2024) 60 each 1    [DISCONTINUED] albuterol (PROVENTIL/VENTOLIN HFA) 90 mcg/actuation inhaler  (Patient not taking: Reported on 6/21/2024)      [DISCONTINUED] buPROPion (WELLBUTRIN XL) 300 MG 24 hr tablet       [DISCONTINUED] busPIRone (BUSPAR) 15 MG tablet Take 15 mg by mouth once daily.      [DISCONTINUED] butalbital-acetaminophen-caffeine -40 mg (FIORICET, ESGIC) -40 mg per tablet TAKE 1 2 TABLET(S) BY MOUTH EVERY 6 HOURS AS NEEDED FOR HEADACHE. DO NOT TAKE MORE THAN 5 PER DAY      [DISCONTINUED] montelukast (SINGULAIR) 10 mg tablet TAKE 1 TABLET BY MOUTH DAILY AS NEEDED FOR SINUS OR RASH      [DISCONTINUED] naproxen (NAPROSYN) 500 MG tablet Take 1 tablet (500 mg total) by mouth every 12 (twelve) hours as needed (Pain). 20 tablet 0    [DISCONTINUED] naproxen (NAPROSYN) 500 MG tablet Take  1 tablet (500 mg total) by mouth every 12 (twelve) hours as needed (Pain). 20 tablet 0    [DISCONTINUED] omeprazole (PRILOSEC) 40 MG capsule Take 1 capsule (40 mg total) by mouth once daily. 30 capsule 11    [DISCONTINUED] ondansetron (ZOFRAN-ODT) 4 MG TbDL Take 1 tablet (4 mg total) by mouth every 8 (eight) hours as needed ([place]). 8 tablet 0    [DISCONTINUED] traMADoL (ULTRAM) 50 mg tablet Take 1 tablet (50 mg total) by mouth every 6 (six) hours as needed for Pain. 8 tablet 0     No current facility-administered medications on file prior to visit.       Anticoagulation:  No    Physical Exam:  Estimated body mass index is 23.65 kg/m² as calculated from the following:    Height as of an earlier encounter on 24: 6' (1.829 m).    Weight as of an earlier encounter on 24: 79.1 kg (174 lb 6.1 oz).     General: No acute distress, well developed. AAOx3  Head: Normocephalic, Atraumatic  Eyes: Extra-occular movements intact, No discharge  Neck: supple, symmetrical, trachea midline  Lungs: normal respiratory effort, no respiratory distress, no wheezes  CV: regular rate, 2+ pulses  Abdomen: soft, non-tender, non-distended, no organomegaly   MSK: no edema, no deformities, normal ROM  Skin: skin color, texture, turgor normal.  Neurologic: no focal deficits, sensation intact     Labs:    Urine dipstick today: Positive for trace protein, negative for all other components.     Lab Results   Component Value Date    WBC 7.03 2024    HGB 16.6 2024    HCT 49.4 2024    MCV 91 2024     2024           BMP  Lab Results   Component Value Date     2024    K 3.9 2024     2024    CO2 26 2024    BUN 14 2024    CREATININE 1.2 2024    CALCIUM 9.1 2024    ANIONGAP 4 2024    EGFRNORACEVR >60.0 2024       Imagin2024  CT ABDOMEN PELVIS WITH IV CONTRAST     CLINICAL HISTORY:  Abdominal pain, acute, nonlocalized;     CT/nuclear  cardiac exams in previous 12 months: None     TECHNIQUE:  Axial CT images were obtained and evaluated with coronal reformatted images.  Iterative reconstruction technique was used.     COMPARISON:  CT abdomen/pelvis 02/27/2023     FINDINGS:  There is a small calcified granuloma within the visualized right lung.  No abnormality identified of the liver, pancreas, spleen or adrenal glands.  Kidneys enhance symmetrically.  The right kidney contain cysts, the largest measuring 3.9 cm.  There is a 3.1 x 2.6 cm heterogeneously enhancing lesion at the inferior left kidney, concerning for renal cell carcinoma.  Subtle hazy fat stranding is seen along the gallbladder, raising the possibility of cholecystitis.  There is colonic diverticulosis.  No evidence of bowel obstruction or appendicitis.  Urinary bladder is unremarkable.  There is a small fat containing umbilical hernia.     Impression:     Subtle hazy fat stranding along the gallbladder, raising the possibility of cholecystitis.  A follow-up gallbladder ultrasound could be obtained for further evaluation.     3.1 x 2.6 cm heterogeneously enhancing lesion at the inferior left kidney, concerning for renal cell carcinoma.  Urological consultation suggested.     Colonic diverticulosis.     Findings discussed with the ordering physician at the time of this dictation.        Electronically signed by:Hemal Pappas MD  Date:                                            05/11/2024  Time:                                           10:06    Assessment: Yasir Coe Jr. is a 44 y.o. male with left renal mass suspicious for malignancy.     Plan:     1. To OR on 7/3/2024 for left robotic partial nephrectomy with Dr. Garnica   2. Urine for culture   3. Type and screen ordered preoperatively. The risks, benefits, and indications of a blood transfusion were discussed. The patient was given a chance to ask questions and all questions answered to his satisfaction. Consent obtained.   4. The  risks and benefits of participating in our clinical trial have been discussed and the patient has consented for the research study here at [facility].   5. Preoperative clearance with Blossom Garcia NP. Will follow up.   6. Preoperative labs are pending.           Wesley Simpson MD

## 2024-06-21 NOTE — PROGRESS NOTES
Urology ([facility]) H&P for upcoming procedure  Staff:  Giancarlo Garnica MD    CC: Left Renal Mass    HPI:    Patient 44 y.o. male presents with incidental finding of left renal mass that is enhancing and concerning for malignancy on CT scan performed for epigastric pain.      He has no family hx of kidney cancer.     Takes testosterone once a week  Xanax     Otherwise healthy, active, no constitutional complaints. No hematuria.      Blood thinners:  None    No Hx of  TIA, CVA, MI    Abdominal surgeries:  None          Urologic History:      2/7/2024 -- CXR -- negative for obvious metastatic disease  5/11/2024 -- CT AP w/ contrast -- 3cm left renal mass concerning for malignancy, 3 cm simple right renal cyst    ROS: Negative except for as stated above    Past Medical History:   Diagnosis Date    Anxiety     GERD (gastroesophageal reflux disease)     improved with dietary changes       Past Surgical History:   Procedure Laterality Date    FRACTURE SURGERY      bilateral arm       Social History     Socioeconomic History    Marital status: Single   Tobacco Use    Smoking status: Never    Smokeless tobacco: Never   Substance and Sexual Activity    Alcohol use: Yes     Alcohol/week: 3.0 - 6.0 standard drinks of alcohol     Types: 3 - 6 Shots of liquor per week    Drug use: Not Currently     Types: Marijuana     Social Determinants of Health     Financial Resource Strain: Medium Risk (6/10/2024)    Overall Financial Resource Strain (CARDIA)     Difficulty of Paying Living Expenses: Somewhat hard   Food Insecurity: No Food Insecurity (6/10/2024)    Hunger Vital Sign     Worried About Running Out of Food in the Last Year: Never true     Ran Out of Food in the Last Year: Never true   Physical Activity: Insufficiently Active (6/10/2024)    Exercise Vital Sign     Days of Exercise per Week: 3 days     Minutes of Exercise per Session: 30 min   Stress: No Stress Concern Present (6/10/2024)    [employer] - Occupational Stress Questionnaire     Feeling of Stress : Only a little   Housing Stability: Unknown (6/10/2024)    Housing Stability Vital Sign     Unable to Pay for Housing in the Last Year: No       Family History   Problem Relation Name Age of Onset    No Known Problems Mother      Heart disease Father      No Known Problems Sister         Review of patient's allergies indicates:  No Known Allergies    Current Outpatient Medications on File Prior to Visit   Medication Sig Dispense Refill    ALPRAZolam (XANAX) 0.5 MG tablet Take 0.5 mg by mouth nightly as needed for Anxiety.      bacitracin 500 unit/gram Oint Apply topically 2 (two) times daily. (Patient taking differently: Apply topically 2 (two) times daily as needed.) 30 g 0    testosterone cypionate (DEPOTESTOTERONE CYPIONATE) 200 mg/mL injection       testosterone enanthate (DELATESTRYL) 200 mg/mL injection Inject into the muscle every 14 (fourteen) days. Pt takes testosterone 2 x's a week      [DISCONTINUED] ADVAIR DISKUS 250-50 mcg/dose diskus inhaler INHALE 1 PUFF INTO THE LUNGS 2 (TWO) TIMES DAILY. CONTROLLER (Patient not taking: Reported on 6/21/2024) 60 each 1    [DISCONTINUED] albuterol (PROVENTIL/VENTOLIN HFA) 90 mcg/actuation inhaler  (Patient not taking: Reported on 6/21/2024)      [DISCONTINUED] buPROPion (WELLBUTRIN XL) 300 MG 24 hr tablet       [DISCONTINUED] busPIRone (BUSPAR) 15 MG tablet Take 15 mg by mouth once daily.      [DISCONTINUED] butalbital-acetaminophen-caffeine -40 mg (FIORICET, ESGIC) -40 mg per tablet TAKE 1 2 TABLET(S) BY MOUTH EVERY 6 HOURS AS NEEDED FOR HEADACHE. DO NOT TAKE MORE THAN 5 PER DAY      [DISCONTINUED] montelukast (SINGULAIR) 10 mg tablet TAKE 1 TABLET BY MOUTH DAILY AS NEEDED FOR SINUS OR RASH      [DISCONTINUED] naproxen (NAPROSYN) 500 MG tablet Take 1 tablet (500 mg total) by mouth every 12 (twelve) hours as needed (Pain). 20 tablet 0    [DISCONTINUED] naproxen (NAPROSYN) 500 MG tablet Take  1 tablet (500 mg total) by mouth every 12 (twelve) hours as needed (Pain). 20 tablet 0    [DISCONTINUED] omeprazole (PRILOSEC) 40 MG capsule Take 1 capsule (40 mg total) by mouth once daily. 30 capsule 11    [DISCONTINUED] ondansetron (ZOFRAN-ODT) 4 MG TbDL Take 1 tablet (4 mg total) by mouth every 8 (eight) hours as needed ([place]). 8 tablet 0    [DISCONTINUED] traMADoL (ULTRAM) 50 mg tablet Take 1 tablet (50 mg total) by mouth every 6 (six) hours as needed for Pain. 8 tablet 0     No current facility-administered medications on file prior to visit.       Anticoagulation:  No    Physical Exam:  Estimated body mass index is 23.65 kg/m² as calculated from the following:    Height as of an earlier encounter on 24: 6' (1.829 m).    Weight as of an earlier encounter on 24: 79.1 kg (174 lb 6.1 oz).     General: No acute distress, well developed. AAOx3  Head: Normocephalic, Atraumatic  Eyes: Extra-occular movements intact, No discharge  Neck: supple, symmetrical, trachea midline  Lungs: normal respiratory effort, no respiratory distress, no wheezes  CV: regular rate, 2+ pulses  Abdomen: soft, non-tender, non-distended, no organomegaly   MSK: no edema, no deformities, normal ROM  Skin: skin color, texture, turgor normal.  Neurologic: no focal deficits, sensation intact     Labs:    Urine dipstick today: Positive for trace protein, negative for all other components.     Lab Results   Component Value Date    WBC 7.03 2024    HGB 16.6 2024    HCT 49.4 2024    MCV 91 2024     2024           BMP  Lab Results   Component Value Date     2024    K 3.9 2024     2024    CO2 26 2024    BUN 14 2024    CREATININE 1.2 2024    CALCIUM 9.1 2024    ANIONGAP 4 2024    EGFRNORACEVR >60.0 2024       Imagin2024  CT ABDOMEN PELVIS WITH IV CONTRAST     CLINICAL HISTORY:  Abdominal pain, acute, nonlocalized;     CT/nuclear  cardiac exams in previous 12 months: None     TECHNIQUE:  Axial CT images were obtained and evaluated with coronal reformatted images.  Iterative reconstruction technique was used.     COMPARISON:  CT abdomen/pelvis 02/27/2023     FINDINGS:  There is a small calcified granuloma within the visualized right lung.  No abnormality identified of the liver, pancreas, spleen or adrenal glands.  Kidneys enhance symmetrically.  The right kidney contain cysts, the largest measuring 3.9 cm.  There is a 3.1 x 2.6 cm heterogeneously enhancing lesion at the inferior left kidney, concerning for renal cell carcinoma.  Subtle hazy fat stranding is seen along the gallbladder, raising the possibility of cholecystitis.  There is colonic diverticulosis.  No evidence of bowel obstruction or appendicitis.  Urinary bladder is unremarkable.  There is a small fat containing umbilical hernia.     Impression:     Subtle hazy fat stranding along the gallbladder, raising the possibility of cholecystitis.  A follow-up gallbladder ultrasound could be obtained for further evaluation.     3.1 x 2.6 cm heterogeneously enhancing lesion at the inferior left kidney, concerning for renal cell carcinoma.  Urological consultation suggested.     Colonic diverticulosis.     Findings discussed with the ordering physician at the time of this dictation.        Electronically signed by:Hemal Pappas MD  Date:                                            05/11/2024  Time:                                           10:06    Assessment: Yasir Coe Jr. is a 44 y.o. male with left renal mass suspicious for malignancy.     Plan:     1. To OR on 7/3/2024 for left robotic partial nephrectomy with Dr. Garnica   2. Urine for culture   3. Type and screen ordered preoperatively. The risks, benefits, and indications of a blood transfusion were discussed. The patient was given a chance to ask questions and all questions answered to his satisfaction. Consent obtained.   4. The  risks and benefits of participating in our clinical trial have been discussed and the patient has consented for the research study here at [facility].   5. Preoperative clearance with Blossom Garcia NP. Will follow up.   6. Preoperative labs are pending.           Wesley Simpson MD

## 2024-06-21 NOTE — PROGRESS NOTES
IRB# [id number]  [facility] Renal Protocol  Date: 21 JUN 2024                     Patient was consented in clinic for a right nephrectomy to occur on 3 JULY 2024.   Dr. Wesley Simpson conducted the informed consent process, and Dr. Giancarlo Garnica (and staff pool) will conduct the nephrectomy.     We will plan to capture tumor samples from:  -normal kidney  -kidney tumor    See media attached to the H&P note for the signed ICF.    Sonia Harp, [facility]

## 2024-06-21 NOTE — ASSESSMENT & PLAN NOTE
No recent testosterone level   Hct level: 49.4  Last PSA level: pending (patient requested level)  Managed per: PCP

## 2024-06-21 NOTE — DISCHARGE INSTRUCTIONS
Your surgery has been scheduled for:_____7/3/24_____________________________________    You should report to:  ____[facility], located on the [place] side of the first floor of the           [facility] ([phone])  _X___[facility], located on the [facility] side of the            Second floor of the [facility] ([phone])  ____3rd Floor [facility] located on the [facility] side of the [facility] [phone]  ____[facility]: located at [address] [place] [address]. [address]     Please Note   Tell your doctor if you take Aspirin, products containing Aspirin, herbal medications  or blood thinners, such as Coumadin, Ticlid, or Plavix.  (Consult your provider regarding holding or stopping before surgery).  Arrange for someone to drive you home following surgery.  You will not be allowed to leave the surgical facility alone or drive yourself home following sedation and anesthesia.    Before Surgery  Stop taking all herbal medications, vitamins, and supplements 7 days prior to surgery  No Motrin/Advil (Ibuprofen) 7 days before surgery  No Aleve (Naproxen) 7 days before surgery   No Goody's/BC Powder 7 days before surgery  Refrain from drinking alcoholic beverages for 24 hours before and after surgery  Stop or limit smoking at least 24 hours prior to surgery  You may take Tylenol for pain    Night before Surgery  Do not eat or drink after midnight  Take a shower or bath (shower is recommended).  Bathe with Hibiclens soap or an antibacterial soap from the neck down.  If not supplied by your surgeon, hibiclens soap will need to be purchased over the counter in pharmacy.  Rinse soap off thoroughly.  Shampoo your hair with your regular shampoo    The Day of Surgery  Take another bath or shower with hibiclens or any antibacterial soap, to reduce the chance of infection.  Take heart and  blood pressure medications with a small sip of water, as advised by the perioperative team.  Do not take fluid pills  You may brush your teeth and rinse your mouth, but do not swallow any additional water.   Do not apply perfumes, powder, body lotions or deodorant on the day of surgery.  Nail polish should be removed.  Do not wear makeup or moisturizer  Wear comfortable clothes, such as a button front shirt and loose fitting pants.  Leave all jewelry, including body piercings, and valuables at home.    Bring any devices you will need after surgery such as crutches or canes.  If you have sleep apnea, please bring your CPAP machine  In the event that your physical condition changes including the onset of a cold or respiratory illness, or if you have to delay or cancel your surgery, please notify your surgeon.

## 2024-06-21 NOTE — ASSESSMENT & PLAN NOTE
Addendum  created 03/18/18 1038 by Paramjit Bingham MD    Anesthesia Event edited, Sign clinical note       Treated with: Xanax prn ; controlled symptoms.   Denies suicidal/ homicidal ideations.

## 2024-06-22 LAB — BACTERIA UR CULT: NO GROWTH

## 2024-07-02 ENCOUNTER — ANESTHESIA EVENT (OUTPATIENT)
Dept: SURGERY | Facility: HOSPITAL | Age: 44
End: 2024-07-02

## 2024-07-02 ENCOUNTER — TELEPHONE (OUTPATIENT)
Dept: UROLOGY | Facility: CLINIC | Age: 44
End: 2024-07-02
Payer: COMMERCIAL

## 2024-07-02 RX ORDER — HEPARIN SODIUM 5000 [USP'U]/ML
5000 INJECTION, SOLUTION INTRAVENOUS; SUBCUTANEOUS
Status: CANCELLED | OUTPATIENT
Start: 2024-07-02 | End: 2024-07-03

## 2024-07-02 NOTE — TELEPHONE ENCOUNTER
Spoke with patient who was able to provide acceptable patient identifiers prior to start of conversation.   The following instructions provided:    Instructions for Day of Surgery     Report To:    The [facility], located across from [place] side of the 1st floor of the hospital    Arrival time: 0600    Please note:     If you are allergic to any medication please let your care team know the day of surgery.  If you have ever had adverse reaction to anesthesia please let your care team know the day of surgery   Please arrange transportation from the hospital, you will not be allowed to drive yourself home from surgery since you have been under sedation or anesthesia   Notify your doctor or care team of any diabetic medications that you take as these may need to be held or adjusted prior to surgery     Before Surgery     You should stop taking any blood thinners for up to 7 days depending on the blood thinner, please let care team know what you are taking so you can be directed when to stop certain medications.    You should hold any aspirin containing products for 7 days    Stop taking any herbal supplements 14 days prior to surgery     Night Before Surgery     Nothing to eat or drink after midnight the night before your surgery  Take medications as instructed the morning of surgery  No alcoholic beverages 24 hours prior to surgery     12oz powerade morning of procedure- no later than 5am.

## 2024-07-03 ENCOUNTER — RESEARCH ENCOUNTER (OUTPATIENT)
Dept: RESEARCH | Facility: HOSPITAL | Age: 44
End: 2024-07-03
Payer: COMMERCIAL

## 2024-07-03 ENCOUNTER — ANESTHESIA (OUTPATIENT)
Dept: SURGERY | Facility: HOSPITAL | Age: 44
End: 2024-07-03

## 2024-07-03 ENCOUNTER — HOSPITAL ENCOUNTER (INPATIENT)
Facility: HOSPITAL | Age: 44
LOS: 2 days | Discharge: HOME | DRG: 661 | End: 2024-07-05
Attending: STUDENT IN AN ORGANIZED HEALTH CARE EDUCATION/TRAINING PROGRAM | Admitting: STUDENT IN AN ORGANIZED HEALTH CARE EDUCATION/TRAINING PROGRAM

## 2024-07-03 DIAGNOSIS — Z01.818 PREOPERATIVE TESTING: Primary | ICD-10-CM

## 2024-07-03 LAB
ABO + RH BLD: NORMAL
ANION GAP SERPL CALC-SCNC: 8 MMOL/L (ref 8–16)
BASOPHILS # BLD AUTO: 0.05 K/UL (ref 0–0.2)
BASOPHILS NFR BLD: 0.4 % (ref 0–1.9)
BLD GP AB SCN CELLS X3 SERPL QL: NORMAL
BNP SERPL-MCNC: 43 PG/ML (ref 0–99)
BUN SERPL-MCNC: 13 MG/DL (ref 6–20)
CALCIUM SERPL-MCNC: 8.2 MG/DL (ref 8.7–10.5)
CHLORIDE SERPL-SCNC: 110 MMOL/L (ref 95–110)
CO2 SERPL-SCNC: 18 MMOL/L (ref 23–29)
CREAT SERPL-MCNC: 1.1 MG/DL (ref 0.5–1.4)
DIFFERENTIAL METHOD BLD: ABNORMAL
EOSINOPHIL # BLD AUTO: 0 K/UL (ref 0–0.5)
EOSINOPHIL NFR BLD: 0.2 % (ref 0–8)
ERYTHROCYTE [DISTWIDTH] IN BLOOD BY AUTOMATED COUNT: 12.6 % (ref 11.5–14.5)
EST. GFR  (NO RACE VARIABLE): >60 ML/MIN/1.73 M^2
GLUCOSE SERPL-MCNC: 127 MG/DL (ref 70–110)
HCT VFR BLD AUTO: 39.8 % (ref 40–54)
HGB BLD-MCNC: 13 G/DL (ref 14–18)
IMM GRANULOCYTES # BLD AUTO: 0.05 K/UL (ref 0–0.04)
IMM GRANULOCYTES NFR BLD AUTO: 0.4 % (ref 0–0.5)
LYMPHOCYTES # BLD AUTO: 0.4 K/UL (ref 1–4.8)
LYMPHOCYTES NFR BLD: 3.6 % (ref 18–48)
MCH RBC QN AUTO: 30.4 PG (ref 27–31)
MCHC RBC AUTO-ENTMCNC: 32.7 G/DL (ref 32–36)
MCV RBC AUTO: 93 FL (ref 82–98)
MONOCYTES # BLD AUTO: 0.8 K/UL (ref 0.3–1)
MONOCYTES NFR BLD: 6.7 % (ref 4–15)
NEUTROPHILS # BLD AUTO: 10.5 K/UL (ref 1.8–7.7)
NEUTROPHILS NFR BLD: 88.7 % (ref 38–73)
NRBC BLD-RTO: 0 /100 WBC
PLATELET # BLD AUTO: 230 K/UL (ref 150–450)
PMV BLD AUTO: 12.2 FL (ref 9.2–12.9)
POTASSIUM SERPL-SCNC: 4.6 MMOL/L (ref 3.5–5.1)
RBC # BLD AUTO: 4.27 M/UL (ref 4.6–6.2)
SODIUM SERPL-SCNC: 136 MMOL/L (ref 136–145)
SPECIMEN OUTDATE: NORMAL
WBC # BLD AUTO: 11.82 K/UL (ref 3.9–12.7)

## 2024-07-03 PROCEDURE — C1729 CATH, DRAINAGE: HCPCS | Performed by: STUDENT IN AN ORGANIZED HEALTH CARE EDUCATION/TRAINING PROGRAM

## 2024-07-03 PROCEDURE — 83880 ASSAY OF NATRIURETIC PEPTIDE: CPT

## 2024-07-03 PROCEDURE — 36000713 HC OR TIME LEV V EA ADD 15 MIN: Performed by: STUDENT IN AN ORGANIZED HEALTH CARE EDUCATION/TRAINING PROGRAM

## 2024-07-03 PROCEDURE — 8E0W4CZ ROBOTIC ASSISTED PROCEDURE OF TRUNK REGION, PERCUTANEOUS ENDOSCOPIC APPROACH: ICD-10-PCS | Performed by: STUDENT IN AN ORGANIZED HEALTH CARE EDUCATION/TRAINING PROGRAM

## 2024-07-03 PROCEDURE — 25000003 PHARM REV CODE 250

## 2024-07-03 PROCEDURE — 36415 COLL VENOUS BLD VENIPUNCTURE: CPT | Performed by: ANESTHESIOLOGY

## 2024-07-03 PROCEDURE — 36415 COLL VENOUS BLD VENIPUNCTURE: CPT

## 2024-07-03 PROCEDURE — 80048 BASIC METABOLIC PNL TOTAL CA: CPT

## 2024-07-03 PROCEDURE — 63600175 PHARM REV CODE 636 W HCPCS: Performed by: NURSE ANESTHETIST, CERTIFIED REGISTERED

## 2024-07-03 PROCEDURE — 0TB14ZZ EXCISION OF LEFT KIDNEY, PERCUTANEOUS ENDOSCOPIC APPROACH: ICD-10-PCS | Performed by: STUDENT IN AN ORGANIZED HEALTH CARE EDUCATION/TRAINING PROGRAM

## 2024-07-03 PROCEDURE — 88342 IMHCHEM/IMCYTCHM 1ST ANTB: CPT | Performed by: PATHOLOGY

## 2024-07-03 PROCEDURE — 63600175 PHARM REV CODE 636 W HCPCS

## 2024-07-03 PROCEDURE — 71000033 HC RECOVERY, INTIAL HOUR: Performed by: STUDENT IN AN ORGANIZED HEALTH CARE EDUCATION/TRAINING PROGRAM

## 2024-07-03 PROCEDURE — 88305 TISSUE EXAM BY PATHOLOGIST: CPT | Mod: 26,,, | Performed by: PATHOLOGY

## 2024-07-03 PROCEDURE — 88307 TISSUE EXAM BY PATHOLOGIST: CPT | Performed by: PATHOLOGY

## 2024-07-03 PROCEDURE — 25000003 PHARM REV CODE 250: Performed by: STUDENT IN AN ORGANIZED HEALTH CARE EDUCATION/TRAINING PROGRAM

## 2024-07-03 PROCEDURE — 25000003 PHARM REV CODE 250: Performed by: NURSE ANESTHETIST, CERTIFIED REGISTERED

## 2024-07-03 PROCEDURE — 37000009 HC ANESTHESIA EA ADD 15 MINS: Performed by: STUDENT IN AN ORGANIZED HEALTH CARE EDUCATION/TRAINING PROGRAM

## 2024-07-03 PROCEDURE — 50543 LAPARO PARTIAL NEPHRECTOMY: CPT | Mod: LT,,, | Performed by: STUDENT IN AN ORGANIZED HEALTH CARE EDUCATION/TRAINING PROGRAM

## 2024-07-03 PROCEDURE — 88307 TISSUE EXAM BY PATHOLOGIST: CPT | Mod: 26,,, | Performed by: PATHOLOGY

## 2024-07-03 PROCEDURE — 11000001 HC ACUTE MED/SURG PRIVATE ROOM

## 2024-07-03 PROCEDURE — 27201037 HC PRESSURE MONITORING SET UP

## 2024-07-03 PROCEDURE — 85025 COMPLETE CBC W/AUTO DIFF WBC: CPT

## 2024-07-03 PROCEDURE — 71000015 HC POSTOP RECOV 1ST HR: Performed by: STUDENT IN AN ORGANIZED HEALTH CARE EDUCATION/TRAINING PROGRAM

## 2024-07-03 PROCEDURE — 63600175 PHARM REV CODE 636 W HCPCS: Performed by: STUDENT IN AN ORGANIZED HEALTH CARE EDUCATION/TRAINING PROGRAM

## 2024-07-03 PROCEDURE — 88305 TISSUE EXAM BY PATHOLOGIST: CPT | Performed by: PATHOLOGY

## 2024-07-03 PROCEDURE — 94761 N-INVAS EAR/PLS OXIMETRY MLT: CPT

## 2024-07-03 PROCEDURE — 88342 IMHCHEM/IMCYTCHM 1ST ANTB: CPT | Mod: 26,,, | Performed by: PATHOLOGY

## 2024-07-03 PROCEDURE — 27201423 OPTIME MED/SURG SUP & DEVICES STERILE SUPPLY: Performed by: STUDENT IN AN ORGANIZED HEALTH CARE EDUCATION/TRAINING PROGRAM

## 2024-07-03 PROCEDURE — 71000016 HC POSTOP RECOV ADDL HR: Performed by: STUDENT IN AN ORGANIZED HEALTH CARE EDUCATION/TRAINING PROGRAM

## 2024-07-03 PROCEDURE — 86900 BLOOD TYPING SEROLOGIC ABO: CPT | Performed by: STUDENT IN AN ORGANIZED HEALTH CARE EDUCATION/TRAINING PROGRAM

## 2024-07-03 PROCEDURE — 37000008 HC ANESTHESIA 1ST 15 MINUTES: Performed by: STUDENT IN AN ORGANIZED HEALTH CARE EDUCATION/TRAINING PROGRAM

## 2024-07-03 PROCEDURE — 76942 ECHO GUIDE FOR BIOPSY: CPT

## 2024-07-03 PROCEDURE — 36000712 HC OR TIME LEV V 1ST 15 MIN: Performed by: STUDENT IN AN ORGANIZED HEALTH CARE EDUCATION/TRAINING PROGRAM

## 2024-07-03 PROCEDURE — 63600175 PHARM REV CODE 636 W HCPCS: Mod: JZ,JG | Performed by: ANESTHESIOLOGY

## 2024-07-03 PROCEDURE — 76998 US GUIDE INTRAOP: CPT | Mod: 26,,, | Performed by: STUDENT IN AN ORGANIZED HEALTH CARE EDUCATION/TRAINING PROGRAM

## 2024-07-03 RX ORDER — HEPARIN SODIUM 5000 [USP'U]/ML
5000 INJECTION, SOLUTION INTRAVENOUS; SUBCUTANEOUS EVERY 8 HOURS
Status: DISCONTINUED | OUTPATIENT
Start: 2024-07-03 | End: 2024-07-03

## 2024-07-03 RX ORDER — ACETAMINOPHEN 500 MG
1000 TABLET ORAL
Status: COMPLETED | OUTPATIENT
Start: 2024-07-03 | End: 2024-07-03

## 2024-07-03 RX ORDER — ROCURONIUM BROMIDE 10 MG/ML
INJECTION, SOLUTION INTRAVENOUS
Status: DISCONTINUED | OUTPATIENT
Start: 2024-07-03 | End: 2024-07-03

## 2024-07-03 RX ORDER — ACETAMINOPHEN 500 MG
1000 TABLET ORAL EVERY 6 HOURS
Status: DISCONTINUED | OUTPATIENT
Start: 2024-07-03 | End: 2024-07-05 | Stop reason: HOSPADM

## 2024-07-03 RX ORDER — DEXAMETHASONE SODIUM PHOSPHATE 4 MG/ML
INJECTION, SOLUTION INTRA-ARTICULAR; INTRALESIONAL; INTRAMUSCULAR; INTRAVENOUS; SOFT TISSUE
Status: DISCONTINUED | OUTPATIENT
Start: 2024-07-03 | End: 2024-07-03

## 2024-07-03 RX ORDER — OXYCODONE HYDROCHLORIDE 5 MG/1
5 TABLET ORAL EVERY 4 HOURS PRN
Status: DISCONTINUED | OUTPATIENT
Start: 2024-07-03 | End: 2024-07-05 | Stop reason: HOSPADM

## 2024-07-03 RX ORDER — ACETAMINOPHEN 325 MG/1
650 TABLET ORAL EVERY 6 HOURS PRN
Status: DISCONTINUED | OUTPATIENT
Start: 2024-07-03 | End: 2024-07-05 | Stop reason: HOSPADM

## 2024-07-03 RX ORDER — KETOROLAC TROMETHAMINE 30 MG/ML
15 INJECTION, SOLUTION INTRAMUSCULAR; INTRAVENOUS EVERY 6 HOURS
Status: DISCONTINUED | OUTPATIENT
Start: 2024-07-03 | End: 2024-07-03

## 2024-07-03 RX ORDER — BUPIVACAINE HYDROCHLORIDE 5 MG/ML
INJECTION, SOLUTION EPIDURAL; INTRACAUDAL
Status: DISCONTINUED | OUTPATIENT
Start: 2024-07-03 | End: 2024-07-03

## 2024-07-03 RX ORDER — DEXMEDETOMIDINE HYDROCHLORIDE 100 UG/ML
INJECTION, SOLUTION INTRAVENOUS
Status: DISCONTINUED | OUTPATIENT
Start: 2024-07-03 | End: 2024-07-03

## 2024-07-03 RX ORDER — CELECOXIB 200 MG/1
400 CAPSULE ORAL
Status: DISCONTINUED | OUTPATIENT
Start: 2024-07-03 | End: 2024-07-03

## 2024-07-03 RX ORDER — FENTANYL CITRATE 50 UG/ML
INJECTION, SOLUTION INTRAMUSCULAR; INTRAVENOUS
Status: DISCONTINUED | OUTPATIENT
Start: 2024-07-03 | End: 2024-07-03

## 2024-07-03 RX ORDER — HALOPERIDOL 5 MG/ML
0.5 INJECTION INTRAMUSCULAR EVERY 10 MIN PRN
Status: DISCONTINUED | OUTPATIENT
Start: 2024-07-03 | End: 2024-07-03 | Stop reason: HOSPADM

## 2024-07-03 RX ORDER — OXYCODONE HYDROCHLORIDE 10 MG/1
10 TABLET ORAL EVERY 4 HOURS PRN
Status: DISCONTINUED | OUTPATIENT
Start: 2024-07-03 | End: 2024-07-05 | Stop reason: HOSPADM

## 2024-07-03 RX ORDER — SODIUM CHLORIDE 0.9 % (FLUSH) 0.9 %
10 SYRINGE (ML) INJECTION
Status: DISCONTINUED | OUTPATIENT
Start: 2024-07-03 | End: 2024-07-03 | Stop reason: HOSPADM

## 2024-07-03 RX ORDER — SODIUM CHLORIDE 9 MG/ML
INJECTION, SOLUTION INTRAVENOUS CONTINUOUS
Status: DISCONTINUED | OUTPATIENT
Start: 2024-07-03 | End: 2024-07-04

## 2024-07-03 RX ORDER — ACETAMINOPHEN 500 MG
1000 TABLET ORAL
Status: DISCONTINUED | OUTPATIENT
Start: 2024-07-03 | End: 2024-07-03

## 2024-07-03 RX ORDER — KETAMINE HCL IN 0.9 % NACL 50 MG/5 ML
SYRINGE (ML) INTRAVENOUS
Status: DISCONTINUED | OUTPATIENT
Start: 2024-07-03 | End: 2024-07-03

## 2024-07-03 RX ORDER — TAMSULOSIN HYDROCHLORIDE 0.4 MG/1
0.4 CAPSULE ORAL NIGHTLY
Status: DISCONTINUED | OUTPATIENT
Start: 2024-07-03 | End: 2024-07-05 | Stop reason: HOSPADM

## 2024-07-03 RX ORDER — DIPHENHYDRAMINE HCL 25 MG
25 CAPSULE ORAL EVERY 12 HOURS PRN
Status: DISCONTINUED | OUTPATIENT
Start: 2024-07-03 | End: 2024-07-05 | Stop reason: HOSPADM

## 2024-07-03 RX ORDER — LIDOCAINE HYDROCHLORIDE 20 MG/ML
INJECTION, SOLUTION EPIDURAL; INFILTRATION; INTRACAUDAL; PERINEURAL
Status: DISCONTINUED | OUTPATIENT
Start: 2024-07-03 | End: 2024-07-03

## 2024-07-03 RX ORDER — FUROSEMIDE 10 MG/ML
INJECTION INTRAMUSCULAR; INTRAVENOUS
Status: DISCONTINUED | OUTPATIENT
Start: 2024-07-03 | End: 2024-07-03

## 2024-07-03 RX ORDER — PREGABALIN 75 MG/1
150 CAPSULE ORAL
Status: COMPLETED | OUTPATIENT
Start: 2024-07-03 | End: 2024-07-03

## 2024-07-03 RX ORDER — MIDAZOLAM HYDROCHLORIDE 1 MG/ML
.5-4 INJECTION, SOLUTION INTRAMUSCULAR; INTRAVENOUS
Status: DISCONTINUED | OUTPATIENT
Start: 2024-07-03 | End: 2024-07-03 | Stop reason: HOSPADM

## 2024-07-03 RX ORDER — MIDAZOLAM HYDROCHLORIDE 1 MG/ML
INJECTION, SOLUTION INTRAMUSCULAR; INTRAVENOUS
Status: DISCONTINUED | OUTPATIENT
Start: 2024-07-03 | End: 2024-07-03

## 2024-07-03 RX ORDER — METHOCARBAMOL 500 MG/1
1000 TABLET, FILM COATED ORAL EVERY 6 HOURS PRN
Status: DISCONTINUED | OUTPATIENT
Start: 2024-07-03 | End: 2024-07-05 | Stop reason: HOSPADM

## 2024-07-03 RX ORDER — FAMOTIDINE 20 MG/1
20 TABLET, FILM COATED ORAL 2 TIMES DAILY
Status: DISCONTINUED | OUTPATIENT
Start: 2024-07-03 | End: 2024-07-05 | Stop reason: HOSPADM

## 2024-07-03 RX ORDER — KETOROLAC TROMETHAMINE 15 MG/ML
15 INJECTION, SOLUTION INTRAMUSCULAR; INTRAVENOUS
Status: COMPLETED | OUTPATIENT
Start: 2024-07-03 | End: 2024-07-03

## 2024-07-03 RX ORDER — FENTANYL CITRATE 50 UG/ML
25 INJECTION, SOLUTION INTRAMUSCULAR; INTRAVENOUS EVERY 5 MIN PRN
Status: DISCONTINUED | OUTPATIENT
Start: 2024-07-03 | End: 2024-07-03 | Stop reason: HOSPADM

## 2024-07-03 RX ORDER — PHENYLEPHRINE HCL IN 0.9% NACL 1 MG/10 ML
SYRINGE (ML) INTRAVENOUS
Status: DISCONTINUED | OUTPATIENT
Start: 2024-07-03 | End: 2024-07-03

## 2024-07-03 RX ORDER — PROPOFOL 10 MG/ML
VIAL (ML) INTRAVENOUS
Status: DISCONTINUED | OUTPATIENT
Start: 2024-07-03 | End: 2024-07-03

## 2024-07-03 RX ORDER — TALC
6 POWDER (GRAM) TOPICAL NIGHTLY PRN
Status: DISCONTINUED | OUTPATIENT
Start: 2024-07-03 | End: 2024-07-05 | Stop reason: HOSPADM

## 2024-07-03 RX ORDER — OXYCODONE HYDROCHLORIDE 5 MG/1
5 TABLET ORAL
Status: DISCONTINUED | OUTPATIENT
Start: 2024-07-03 | End: 2024-07-03 | Stop reason: HOSPADM

## 2024-07-03 RX ORDER — CEFAZOLIN 2 G/1
INJECTION, POWDER, FOR SOLUTION INTRAMUSCULAR; INTRAVENOUS
Status: DISCONTINUED | OUTPATIENT
Start: 2024-07-03 | End: 2024-07-03

## 2024-07-03 RX ORDER — BUPIVACAINE HYDROCHLORIDE 7.5 MG/ML
INJECTION, SOLUTION EPIDURAL; RETROBULBAR
Status: COMPLETED | OUTPATIENT
Start: 2024-07-03 | End: 2024-07-03

## 2024-07-03 RX ORDER — ONDANSETRON HYDROCHLORIDE 2 MG/ML
4 INJECTION, SOLUTION INTRAVENOUS EVERY 6 HOURS PRN
Status: DISCONTINUED | OUTPATIENT
Start: 2024-07-03 | End: 2024-07-05 | Stop reason: HOSPADM

## 2024-07-03 RX ORDER — PREGABALIN 150 MG/1
150 CAPSULE ORAL NIGHTLY
Status: DISCONTINUED | OUTPATIENT
Start: 2024-07-03 | End: 2024-07-05 | Stop reason: HOSPADM

## 2024-07-03 RX ORDER — FENTANYL CITRATE 50 UG/ML
25-200 INJECTION, SOLUTION INTRAMUSCULAR; INTRAVENOUS
Status: DISCONTINUED | OUTPATIENT
Start: 2024-07-03 | End: 2024-07-03 | Stop reason: HOSPADM

## 2024-07-03 RX ADMIN — FAMOTIDINE 20 MG: 20 TABLET ORAL at 08:07

## 2024-07-03 RX ADMIN — FUROSEMIDE 10 MG: 10 INJECTION, SOLUTION INTRAMUSCULAR; INTRAVENOUS at 11:07

## 2024-07-03 RX ADMIN — ACETAMINOPHEN 1000 MG: 500 TABLET ORAL at 11:07

## 2024-07-03 RX ADMIN — Medication 100 MCG: at 08:07

## 2024-07-03 RX ADMIN — Medication 30 MG: at 08:07

## 2024-07-03 RX ADMIN — ROCURONIUM BROMIDE 50 MG: 10 INJECTION, SOLUTION INTRAVENOUS at 08:07

## 2024-07-03 RX ADMIN — ROCURONIUM BROMIDE 20 MG: 10 INJECTION, SOLUTION INTRAVENOUS at 09:07

## 2024-07-03 RX ADMIN — MIDAZOLAM 2 MG: 1 INJECTION INTRAMUSCULAR; INTRAVENOUS at 07:07

## 2024-07-03 RX ADMIN — FENTANYL CITRATE 100 MCG: 50 INJECTION, SOLUTION INTRAMUSCULAR; INTRAVENOUS at 08:07

## 2024-07-03 RX ADMIN — DEXMEDETOMIDINE 8 MCG: 100 INJECTION, SOLUTION, CONCENTRATE INTRAVENOUS at 09:07

## 2024-07-03 RX ADMIN — ACETAMINOPHEN 1000 MG: 500 TABLET ORAL at 07:07

## 2024-07-03 RX ADMIN — DEXAMETHASONE SODIUM PHOSPHATE 4 MG: 4 INJECTION, SOLUTION INTRAMUSCULAR; INTRAVENOUS at 10:07

## 2024-07-03 RX ADMIN — ROCURONIUM BROMIDE 10 MG: 10 INJECTION, SOLUTION INTRAVENOUS at 11:07

## 2024-07-03 RX ADMIN — Medication 10 MG: at 10:07

## 2024-07-03 RX ADMIN — Medication 100 MG: at 08:07

## 2024-07-03 RX ADMIN — HEPARIN SODIUM 5000 UNITS: 5000 INJECTION INTRAVENOUS; SUBCUTANEOUS at 05:07

## 2024-07-03 RX ADMIN — CEFAZOLIN 2 EACH: 330 INJECTION, POWDER, FOR SOLUTION INTRAMUSCULAR; INTRAVENOUS at 08:07

## 2024-07-03 RX ADMIN — Medication 10 MG: at 09:07

## 2024-07-03 RX ADMIN — SODIUM CHLORIDE: 0.9 INJECTION, SOLUTION INTRAVENOUS at 07:07

## 2024-07-03 RX ADMIN — MIDAZOLAM HYDROCHLORIDE 2 MG: 2 INJECTION, SOLUTION INTRAMUSCULAR; INTRAVENOUS at 08:07

## 2024-07-03 RX ADMIN — KETOROLAC TROMETHAMINE 15 MG: 30 INJECTION, SOLUTION INTRAMUSCULAR; INTRAVENOUS at 05:07

## 2024-07-03 RX ADMIN — PREGABALIN 150 MG: 75 CAPSULE ORAL at 07:07

## 2024-07-03 RX ADMIN — ACETAMINOPHEN 1000 MG: 500 TABLET ORAL at 05:07

## 2024-07-03 RX ADMIN — DEXMEDETOMIDINE 4 MCG: 100 INJECTION, SOLUTION, CONCENTRATE INTRAVENOUS at 11:07

## 2024-07-03 RX ADMIN — SODIUM CHLORIDE: 9 INJECTION, SOLUTION INTRAVENOUS at 01:07

## 2024-07-03 RX ADMIN — METHOCARBAMOL 1000 MG: 500 TABLET ORAL at 08:07

## 2024-07-03 RX ADMIN — METHOCARBAMOL 1000 MG: 500 TABLET ORAL at 02:07

## 2024-07-03 RX ADMIN — DEXMEDETOMIDINE 4 MCG: 100 INJECTION, SOLUTION, CONCENTRATE INTRAVENOUS at 10:07

## 2024-07-03 RX ADMIN — ROCURONIUM BROMIDE 10 MG: 10 INJECTION, SOLUTION INTRAVENOUS at 10:07

## 2024-07-03 RX ADMIN — BUPIVACAINE HYDROCHLORIDE 30 ML: 7.5 INJECTION, SOLUTION EPIDURAL; RETROBULBAR at 07:07

## 2024-07-03 RX ADMIN — Medication 6 MG: at 08:07

## 2024-07-03 RX ADMIN — SODIUM CHLORIDE, SODIUM GLUCONATE, SODIUM ACETATE, POTASSIUM CHLORIDE, MAGNESIUM CHLORIDE, SODIUM PHOSPHATE, DIBASIC, AND POTASSIUM PHOSPHATE: .53; .5; .37; .037; .03; .012; .00082 INJECTION, SOLUTION INTRAVENOUS at 08:07

## 2024-07-03 RX ADMIN — LIDOCAINE HYDROCHLORIDE 100 ML: 20 INJECTION, SOLUTION EPIDURAL; INFILTRATION; INTRACAUDAL; PERINEURAL at 08:07

## 2024-07-03 RX ADMIN — OXYCODONE HYDROCHLORIDE 10 MG: 10 TABLET ORAL at 02:07

## 2024-07-03 RX ADMIN — PHENYLEPHRINE HYDROCHLORIDE 0.2 MCG/KG/MIN: 10 INJECTION INTRAVENOUS at 09:07

## 2024-07-03 RX ADMIN — PREGABALIN 150 MG: 150 CAPSULE ORAL at 08:07

## 2024-07-03 RX ADMIN — SODIUM CHLORIDE, SODIUM GLUCONATE, SODIUM ACETATE, POTASSIUM CHLORIDE, MAGNESIUM CHLORIDE, SODIUM PHOSPHATE, DIBASIC, AND POTASSIUM PHOSPHATE: .53; .5; .37; .037; .03; .012; .00082 INJECTION, SOLUTION INTRAVENOUS at 12:07

## 2024-07-03 RX ADMIN — CEFAZOLIN 2 EACH: 330 INJECTION, POWDER, FOR SOLUTION INTRAMUSCULAR; INTRAVENOUS at 12:07

## 2024-07-03 RX ADMIN — Medication 50 MG: at 08:07

## 2024-07-03 RX ADMIN — TAMSULOSIN HYDROCHLORIDE 0.4 MG: 0.4 CAPSULE ORAL at 08:07

## 2024-07-03 RX ADMIN — DIPHENHYDRAMINE HYDROCHLORIDE 25 MG: 25 CAPSULE ORAL at 08:07

## 2024-07-03 RX ADMIN — OXYCODONE HYDROCHLORIDE 10 MG: 10 TABLET ORAL at 09:07

## 2024-07-03 RX ADMIN — DEXMEDETOMIDINE 4 MCG: 100 INJECTION, SOLUTION, CONCENTRATE INTRAVENOUS at 12:07

## 2024-07-03 RX ADMIN — DEXMEDETOMIDINE 4 MCG: 100 INJECTION, SOLUTION, CONCENTRATE INTRAVENOUS at 01:07

## 2024-07-03 RX ADMIN — FENTANYL CITRATE 100 MCG: 50 INJECTION, SOLUTION INTRAMUSCULAR; INTRAVENOUS at 07:07

## 2024-07-03 RX ADMIN — KETOROLAC TROMETHAMINE 15 MG: 15 INJECTION, SOLUTION INTRAMUSCULAR; INTRAVENOUS at 07:07

## 2024-07-03 RX ADMIN — FENTANYL CITRATE 50 MCG: 50 INJECTION, SOLUTION INTRAMUSCULAR; INTRAVENOUS at 09:07

## 2024-07-03 RX ADMIN — DEXMEDETOMIDINE 8 MCG: 100 INJECTION, SOLUTION, CONCENTRATE INTRAVENOUS at 01:07

## 2024-07-03 NOTE — TRANSFER OF CARE
Anesthesia Transfer of Care Note    Patient: Yasir Coe Jr.    Procedure(s) Performed: Procedure(s) (LRB):  DV5 ROBOTIC NEPHRECTOMY PARTIAL (Left)    Patient location: PACU    Anesthesia Type: general    Transport from OR: Transported from OR on 6-10 L/min O2 by face mask with adequate spontaneous ventilation    Post pain: adequate analgesia    Post assessment: no apparent anesthetic complications    Post vital signs: stable    Level of consciousness: awake and alert    Nausea/Vomiting: no nausea/vomiting    Complications: none    Transfer of care protocol was followed      Last vitals: Visit Vitals  /66   Pulse 100   Temp 36.2 °C (97.2 °F) (Temporal)   Resp 14   Ht 6' (1.829 m)   Wt 79.4 kg (175 lb)   SpO2 100%   BMI 23.73 kg/m²

## 2024-07-03 NOTE — ANESTHESIA POSTPROCEDURE EVALUATION
Anesthesia Post Evaluation    Patient: Yasir Coe Jr.    Procedure(s) Performed: Procedure(s) (LRB):  DV5 ROBOTIC NEPHRECTOMY PARTIAL (Left)    Final Anesthesia Type: general      Patient location during evaluation: PACU  Patient participation: Yes- Able to Participate  Level of consciousness: awake and alert and oriented  Post-procedure vital signs: reviewed and stable  Pain management: adequate  Airway patency: patent    PONV status at discharge: No PONV  Anesthetic complications: no      Cardiovascular status: blood pressure returned to baseline  Respiratory status: unassisted                Vitals Value Taken Time   /61 07/03/24 1401   Temp 36.2 °C (97.2 °F) 07/03/24 1335   Pulse 85 07/03/24 1405   Resp 8 07/03/24 1405   SpO2 95 % 07/03/24 1405   Vitals shown include unfiled device data.      No case tracking events are documented in the log.      Pain/Sudhir Score: Pain Rating Prior to Med Admin: 7 (7/3/2024  2:04 PM)  Pain Rating Post Med Admin: 0 (7/3/2024  8:00 AM)  Sudhir Score: 7 (7/3/2024  1:35 PM)

## 2024-07-03 NOTE — ANESTHESIA PROCEDURE NOTES
Intubation    Date/Time: 7/3/2024 8:13 AM    Performed by: Berta Ba  Authorized by: Toro Lezama MD    Intubation:     Induction:  Intravenous    Intubated:  Postinduction    Mask Ventilation:  Easy mask    Attempts:  1    Attempted By:  Student    Method of Intubation:  Video laryngoscopy    Blade:  Merino 3    Laryngeal View Grade: Grade I - full view of cords      Difficult Airway Encountered?: No      Complications:  None    Airway Device:  Oral endotracheal tube    Airway Device Size:  7.0    Style/Cuff Inflation:  Cuffed (inflated to minimal occlusive pressure)    Tube secured:  21    Secured at:  The lips    Placement Verified By:  Capnometry    Complicating Factors:  None    Findings Post-Intubation:  BS equal bilateral and atraumatic/condition of teeth unchanged

## 2024-07-03 NOTE — NURSING TRANSFER
Nursing Transfer Note      Reason patient is being transferred: post op    Transfer To: 530    Transfer via bed    Transfer with 2L O2 per NC    Transported by PCT    Medicines sent: none    Any special needs or follow-up needed: routine    Chart send with patient: Yes    Notified: family via surgical texting system     Patient reassessed at: 7/3/2024 3:00 PM

## 2024-07-03 NOTE — OP NOTE
[facility]    Operative Note       DATE OF PROCEDURE:   7/3/2024    PRE-OP DIAGNOSES:   1) Left renal mass     POST-OP DIAGNOSES AND OPERATIVE FINDINGS:   1) Left renal mass    PROCEDURES:  1) Left robotic-assisted partial nephrectomy, transperitoneal approach    SURGEONS:   Surgeons and Role:     * Giancarlo Garnica MD - Primary     * Rajat Chapa MD - Resident - Assisting     * Christian Hyde MD - Resident - Chief    ANESTHESIA:   Anesthesiologist: Toro Lezama MD  CRNA: Malia Gordillo CRNA  Student Nurse Anesthetist: Berta Ba    STAFF:   Circulator: Sugar Morgan RN  Relief Circulator: Airam Ortiz RN  Relief Scrub: Ana Ahumada ST  Scrub Person: Vanesa Oconnell ST    ANESTHESIA TYPE:  General anesthesia with CHARLENE block    ESTIMATED BLOOD LOSS: 300 mL    COMPLICATIONS:   None    ANTIBIOTICS:  Cephazolin    VTE PROPHYLAXIS: Pneumatic compression stockings and low-dose subcutaneous heparin was not done.      PROCEDURE SUMMARY:     The patient was brought to the operating room and anesthesia obtained.  A 16 F Oliveros catheter was placed with sterile technique. The patient was then placed in a modified flank position with the Leftside up, and prepped and draped using standard sterile technique.  All pressure points were carefully padded.  A surgical time-out occurred, antibiotics were administered.       We made a small incision with the #11 blade between the ASIS and the umibilicus and introduced the Veress needle into the abdomen.  Aspiration was unremarkable and good positioning was confirmed with the saline drip test then pneumoperitoneum obtained to 15 mmHg.  The patient tolerated this with no issues. We marked out the linea semilunaris and four 8mm ports were placed along that. After the first port was in position, the robotic camera was inserted and the viscera examined.  No evidence of carcinomatosis or bowel injury was noted. The remaining three 8mm ports were  placed under vision. A 12mm airseal port was placed periumbilically in the midline.  The robot was then docked.     The descending colon was mobilized along the line of Toldt using a combination of sharp and blunt dissection.  The aorta was identified and its lateral border exposed.  The Left kidney was then carefully dissected and the renal hilum identified. The ureter and adrenal gland were identified and protected.  The kidney was then defatted and the renal hilum skeletonized to allow for accurate clamping of the renal vasculature.  Intraoperative ultrasound was then used and the tumor was clearly identified on the kidney.  Then renal cortex surrounding the tumor was defatted and then scored with electrocautery. The renal artery was clamped with atraumatic vascular bulldog clamps. The tumor was resected and placed temporarily behind the kidney. Margins were grossly negative.     The renorrhaphy was then begun, starting with a 3-0 V-Loc Maxon suture to close blood vessels and any visible collecting system. Early vascular unclamping  was performed and warm ischemia time was 27 minutes. We  did use 0 Polysorb suture sewn in a Lembert fashion through the tumor crater with a sliding Weck technique reinforced with Lapra-Ty clips for additional compression.  A bolster of Gelfoam wrapped in Surgicel was placed in the tumor crater. Floseal was applied, and surgicel was used to cover the defect. Hemostasis was excellent at low insufflation pressure.     The mass and associated peritumoral fat were placed in an Endocatch bag. No evidence of active bleeding was noted. The viscera showed no evidence of iatrogenic trauma. A drain was placed in the perinephric space from the 4th arm port site. The robotic arms were then undocked and the pneumoperitoneum deflated.  The 12 mm assistant port site was extended to approximately 4 cm and the mass was then removed intact within the Endocatch bag and sent for pathology. Frozen section  was not performed. .       All ports were removed. The extraction site fascia was closed with running 0 Maxon suture. Incisions were irrigated and closed with 3-0 Polysorb suture and subcuticular 4-0 Biosyn. The drain was secured with 2-0 Nylon. The wounds were cleaned and dressed with dermal glue and a sterile drain dressing was applied.  Prior to completion of closure, an instrument, needle, and sponge count was obtained and was correct. The patient was repositioned and extubated without complication    DISPOSITION:   PACU - hemodynamically stable.    ATTESTATION:       SPECIMENS:   Specimens (From admission, onward)       Start     Ordered    07/03/24 1240  Specimen to Pathology, Surgery Urology  Once        Comments: Pre-op Diagnosis: Left renal mass [N28.89]Procedure(s):DV5 ROBOTIC NEPHRECTOMY PARTIAL Number of specimens: 2Name of specimens: 1. Fat Overlying Tumor- permanent2. Left renal mass - permanent     References:    Click here for ordering Quick Tip   Question Answer Comment   Procedure Type: Urology    Specimen Class: Known or suspected malignancy    Release to patient Immediate        07/03/24 1243                     IMPLANTS:  * No implants in log *

## 2024-07-03 NOTE — ANESTHESIA PROCEDURE NOTES
Arterial    Diagnosis: Renal neoplasm    Patient location during procedure: done in OR  Timeout: 7/3/2024 8:15 AM  Procedure end time: 7/3/2024 8:20 AM    Staffing  Authorizing Provider: Toro Lezama MD  Performing Provider: Toro Lezama MD    Staffing  Performed by: Toro Lezama MD  Authorized by: Toro Lezama MD    Anesthesiologist was present at the time of the procedure.    Preanesthetic Checklist  Completed: patient identified, IV checked, site marked, risks and benefits discussed, surgical consent, monitors and equipment checked, pre-op evaluation, timeout performed and anesthesia consent givenArterial  Skin Prep: chlorhexidine gluconate  Orientation: right  Location: radial    Catheter Size: 20 G  Catheter placement by Ultrasound guidance. Heme positive aspiration all ports.   Vessel Caliber: medium, patent, compressibility normal  Needle advanced into vessel with real time Ultrasound guidance.Insertion Attempts: 2  Assessment  Dressing: secured with tape and tegaderm  Patient: Tolerated well

## 2024-07-03 NOTE — BRIEF OP NOTE
[facility] - Surgery ([facility])  Brief Operative Note    SUMMARY     Surgery Date: 7/3/2024     Surgeons and Role:     * Giancarlo Garnica MD - Primary     * Rajat Chapa MD - Resident - Assisting     * Christian Hyde MD - Resident - Chief        Pre-op Diagnosis:  Left renal mass [N28.89]    Post-op Diagnosis:  Post-Op Diagnosis Codes:     * Left renal mass [N28.89]    Procedure(s) (LRB):  DV5 ROBOTIC NEPHRECTOMY PARTIAL (Left)    Anesthesia: General/Regional    Implants:  * No implants in log *    Operative Findings:   - L partial nephrectomy performed without immediate complication.    Estimated Blood Loss: 350 mL    Estimated Blood Loss has not been documented. EBL = 300.         Specimens:   Specimen (24h ago, onward)       Start     Ordered    07/03/24 1240  Specimen to Pathology, Surgery Urology  Once        Comments: Pre-op Diagnosis: Left renal mass [N28.89]Procedure(s):DV5 ROBOTIC NEPHRECTOMY PARTIAL Number of specimens: 2Name of specimens: 1. Fat Overlying Tumor- permanent2. Left renal mass - permanent     References:    Click here for ordering Quick Tip   Question Answer Comment   Procedure Type: Urology    Specimen Class: Known or suspected malignancy    Release to patient Immediate        07/03/24 1243                    [id number]

## 2024-07-03 NOTE — ANESTHESIA PREPROCEDURE EVALUATION
07/03/2024  Yasir Coe Jr. is a 44 y.o., male.    Patient Active Problem List   Diagnosis    Left renal mass    Mild intermittent asthma without complication    SHIRA (obstructive sleep apnea)    Anxiety    Current use of testosterone replacement therapy     Past Surgical History:   Procedure Laterality Date    FRACTURE SURGERY      bilateral arm       Pre-op Assessment    I have reviewed the Patient Summary Reports.     I have reviewed the Nursing Notes. I have reviewed the NPO Status.   I have reviewed the Medications.     Review of Systems      Physical Exam  General: Well nourished    Airway:  Mallampati: II   Mouth Opening: Normal  TM Distance: Normal  Tongue: Normal  Neck ROM: Normal ROM        Anesthesia Plan  Type of Anesthesia, risks & benefits discussed:    Anesthesia Type: Gen ETT  Intra-op Monitoring Plan: Standard ASA Monitors  Post Op Pain Control Plan: multimodal analgesia  Induction:  IV  Airway Plan: Direct and Video  Informed Consent: Informed consent signed with the Patient and all parties understand the risks and agree with anesthesia plan.  All questions answered.   ASA Score: 2  Day of Surgery Review of History & Physical: H&P Update referred to the surgeon/provider.    Ready For Surgery From Anesthesia Perspective.     .

## 2024-07-03 NOTE — ANESTHESIA PROCEDURE NOTES
CHARLENE SS    Patient location during procedure: pre-op   Block not for primary anesthetic.  Reason for block: at surgeon's request and post-op pain management   Post-op Pain Location: Abdominal pain   Start time: 7/3/2024 7:42 AM  Timeout: 7/3/2024 7:42 AM   End time: 7/3/2024 7:50 AM    Staffing  Authorizing Provider: Dana Mcadams MD  Performing Provider: Kp Oconnell MD    Staffing  Performed by: Kp Oconnell MD  Authorized by: Dana Mcadams MD    Preanesthetic Checklist  Completed: patient identified, IV checked, site marked, risks and benefits discussed, surgical consent, monitors and equipment checked, pre-op evaluation and timeout performed  Peripheral Block  Patient position: sitting  Prep: ChloraPrep  Patient monitoring: heart rate, cardiac monitor, continuous pulse ox, continuous capnometry and frequent blood pressure checks  Block type: erector spinae plane  Laterality: bilateral  Injection technique: single shot  Interspace: T7-8    Needle  Needle type: Tuohy   Needle gauge: 17 G  Needle length: 3.5 in  Needle localization: anatomical landmarks and ultrasound guidance   -ultrasound image captured on disc.  Assessment  Injection assessment: negative aspiration, negative parasthesia and local visualized surrounding nerve  Paresthesia pain: none  Heart rate change: no  Slow fractionated injection: yes  Pain Tolerance: comfortable throughout block and no complaints  Medications:    Medications: bupivacaine (pf) (MARCAINE) injection 0.75% - Perineural   30 mL - 7/3/2024 7:45:00 AM    Additional Notes  Patient tolerated well.  [facility].

## 2024-07-04 LAB
ANION GAP SERPL CALC-SCNC: 5 MMOL/L (ref 8–16)
BASOPHILS # BLD AUTO: 0.06 K/UL (ref 0–0.2)
BASOPHILS NFR BLD: 0.7 % (ref 0–1.9)
BODY FLUID SOURCE, CREATININE: NORMAL
BODY FLUID SOURCE, CREATININE: NORMAL
BUN SERPL-MCNC: 11 MG/DL (ref 6–20)
CALCIUM SERPL-MCNC: 8 MG/DL (ref 8.7–10.5)
CHLORIDE SERPL-SCNC: 110 MMOL/L (ref 95–110)
CO2 SERPL-SCNC: 23 MMOL/L (ref 23–29)
CREAT FLD-MCNC: 1.1 MG/DL
CREAT FLD-MCNC: 1.9 MG/DL
CREAT SERPL-MCNC: 1 MG/DL (ref 0.5–1.4)
DIFFERENTIAL METHOD BLD: ABNORMAL
EOSINOPHIL # BLD AUTO: 0 K/UL (ref 0–0.5)
EOSINOPHIL NFR BLD: 0.4 % (ref 0–8)
ERYTHROCYTE [DISTWIDTH] IN BLOOD BY AUTOMATED COUNT: 12.8 % (ref 11.5–14.5)
EST. GFR  (NO RACE VARIABLE): >60 ML/MIN/1.73 M^2
GLUCOSE SERPL-MCNC: 102 MG/DL (ref 70–110)
HCT VFR BLD AUTO: 39.1 % (ref 40–54)
HGB BLD-MCNC: 13.1 G/DL (ref 14–18)
IMM GRANULOCYTES # BLD AUTO: 0.02 K/UL (ref 0–0.04)
IMM GRANULOCYTES NFR BLD AUTO: 0.2 % (ref 0–0.5)
LYMPHOCYTES # BLD AUTO: 1 K/UL (ref 1–4.8)
LYMPHOCYTES NFR BLD: 10.8 % (ref 18–48)
MCH RBC QN AUTO: 31.1 PG (ref 27–31)
MCHC RBC AUTO-ENTMCNC: 33.5 G/DL (ref 32–36)
MCV RBC AUTO: 93 FL (ref 82–98)
MONOCYTES # BLD AUTO: 1 K/UL (ref 0.3–1)
MONOCYTES NFR BLD: 10.7 % (ref 4–15)
NEUTROPHILS # BLD AUTO: 7 K/UL (ref 1.8–7.7)
NEUTROPHILS NFR BLD: 77.2 % (ref 38–73)
NRBC BLD-RTO: 0 /100 WBC
PLATELET # BLD AUTO: 196 K/UL (ref 150–450)
PMV BLD AUTO: 12.5 FL (ref 9.2–12.9)
POTASSIUM SERPL-SCNC: 4.2 MMOL/L (ref 3.5–5.1)
RBC # BLD AUTO: 4.21 M/UL (ref 4.6–6.2)
SODIUM SERPL-SCNC: 138 MMOL/L (ref 136–145)
WBC # BLD AUTO: 9.07 K/UL (ref 3.9–12.7)

## 2024-07-04 PROCEDURE — 82570 ASSAY OF URINE CREATININE: CPT | Mod: 91 | Performed by: STUDENT IN AN ORGANIZED HEALTH CARE EDUCATION/TRAINING PROGRAM

## 2024-07-04 PROCEDURE — 11000001 HC ACUTE MED/SURG PRIVATE ROOM

## 2024-07-04 PROCEDURE — 85025 COMPLETE CBC W/AUTO DIFF WBC: CPT | Performed by: STUDENT IN AN ORGANIZED HEALTH CARE EDUCATION/TRAINING PROGRAM

## 2024-07-04 PROCEDURE — 36415 COLL VENOUS BLD VENIPUNCTURE: CPT | Performed by: STUDENT IN AN ORGANIZED HEALTH CARE EDUCATION/TRAINING PROGRAM

## 2024-07-04 PROCEDURE — 80048 BASIC METABOLIC PNL TOTAL CA: CPT | Performed by: STUDENT IN AN ORGANIZED HEALTH CARE EDUCATION/TRAINING PROGRAM

## 2024-07-04 PROCEDURE — 25000003 PHARM REV CODE 250: Performed by: STUDENT IN AN ORGANIZED HEALTH CARE EDUCATION/TRAINING PROGRAM

## 2024-07-04 PROCEDURE — 82570 ASSAY OF URINE CREATININE: CPT | Performed by: STUDENT IN AN ORGANIZED HEALTH CARE EDUCATION/TRAINING PROGRAM

## 2024-07-04 RX ADMIN — OXYCODONE HYDROCHLORIDE 10 MG: 10 TABLET ORAL at 06:07

## 2024-07-04 RX ADMIN — SODIUM CHLORIDE: 9 INJECTION, SOLUTION INTRAVENOUS at 04:07

## 2024-07-04 RX ADMIN — TAMSULOSIN HYDROCHLORIDE 0.4 MG: 0.4 CAPSULE ORAL at 09:07

## 2024-07-04 RX ADMIN — OXYCODONE HYDROCHLORIDE 10 MG: 10 TABLET ORAL at 04:07

## 2024-07-04 RX ADMIN — ACETAMINOPHEN 1000 MG: 500 TABLET ORAL at 05:07

## 2024-07-04 RX ADMIN — OXYCODONE HYDROCHLORIDE 10 MG: 10 TABLET ORAL at 08:07

## 2024-07-04 RX ADMIN — OXYCODONE HYDROCHLORIDE 10 MG: 10 TABLET ORAL at 02:07

## 2024-07-04 RX ADMIN — FAMOTIDINE 20 MG: 20 TABLET ORAL at 08:07

## 2024-07-04 RX ADMIN — ACETAMINOPHEN 1000 MG: 500 TABLET ORAL at 02:07

## 2024-07-04 RX ADMIN — PREGABALIN 150 MG: 150 CAPSULE ORAL at 09:07

## 2024-07-04 RX ADMIN — ACETAMINOPHEN 1000 MG: 500 TABLET ORAL at 06:07

## 2024-07-04 RX ADMIN — OXYCODONE HYDROCHLORIDE 5 MG: 5 TABLET ORAL at 11:07

## 2024-07-04 RX ADMIN — FAMOTIDINE 20 MG: 20 TABLET ORAL at 09:07

## 2024-07-04 RX ADMIN — METHOCARBAMOL 1000 MG: 500 TABLET ORAL at 04:07

## 2024-07-04 RX ADMIN — Medication 6 MG: at 09:07

## 2024-07-04 RX ADMIN — ACETAMINOPHEN 1000 MG: 500 TABLET ORAL at 11:07

## 2024-07-04 RX ADMIN — DIPHENHYDRAMINE HYDROCHLORIDE 25 MG: 25 CAPSULE ORAL at 09:07

## 2024-07-04 RX ADMIN — METHOCARBAMOL 1000 MG: 500 TABLET ORAL at 09:07

## 2024-07-04 NOTE — NURSING
Nurses Note -- 4 Eyes      7/4/2024   6:18 PM      Skin assessed during: Daily Assessment      [x] No Altered Skin Integrity Present    []Prevention Measures Documented      [] Yes- Altered Skin Integrity Present or Discovered   [] LDA Added if Not in Epic (Describe Wound)   [] New Altered Skin Integrity was Present on Admit and Documented in LDA   [] Wound Image Taken    Wound Care Consulted? No    Attending Nurse:  Negrita Lawrence LPN      Second RN/Staff Member:   Garret Marcano RN

## 2024-07-04 NOTE — ASSESSMENT & PLAN NOTE
- Continue regular diet  - Miralax daily prn constipation  - Heparin held  - Maintain SCDs  - Continue prn pain and nausea control  - Continue to encourage ambulation  - Continue IS use  - d/c mIVF   - ralph out, voiding trial to follow    Dispo: remain inpatient today. Voiding trial today. Will continue to monitor drain character and output, likely repeat ESTEFANY Cr tomorrow and plan for removal prior to d/c.

## 2024-07-04 NOTE — NURSING
Pt ambulated in hallway with ralph and ESTEFANY draining place, post walk increase red urine from ralph

## 2024-07-04 NOTE — PLAN OF CARE
Problem: Adult Inpatient Plan of Care  Goal: Plan of Care Review  Outcome: Progressing  Goal: Patient-Specific Goal (Individualized)  Outcome: Progressing  Goal: Absence of Hospital-Acquired Illness or Injury  Outcome: Progressing  Goal: Optimal Comfort and Wellbeing  Outcome: Progressing  Goal: Readiness for Transition of Care  Outcome: Progressing     Problem: Adult Inpatient Plan of Care  Goal: Plan of Care Review  Outcome: Progressing     Problem: Adult Inpatient Plan of Care  Goal: Patient-Specific Goal (Individualized)  Outcome: Progressing     Problem: Adult Inpatient Plan of Care  Goal: Absence of Hospital-Acquired Illness or Injury  Outcome: Progressing     Problem: Adult Inpatient Plan of Care  Goal: Optimal Comfort and Wellbeing  Outcome: Progressing  PRN medication effective for pain . Explained plan of care, verbalized understanding. Educated pt .on new medicine . No injury during shift, Side rails up x 2, call light by bedside.  Pt ambulated in hallway post ambulation dark red urine and clots in ralph contacted provider, on Call, Provider irrigated ralph and returned  light pink urine

## 2024-07-04 NOTE — NURSING
Pt called nurse to room urinating around ralph post ambulation;  irrigated with normal saline 120 produced clots    and dark urine returned in ralph will continue to monitor thru this shift

## 2024-07-04 NOTE — SUBJECTIVE & OBJECTIVE
Interval History: Some concerns with hematuria and sanguineous drain output overnight. Labs repeated, patient made NPO in anticipation of possible procedure this AM. However, this AM he states he feels phenomenal. The character of both his drain and catheter have drastically improved. He has ambulated and eaten without issue.    Objective:     Temp:  [96.5 °F (35.8 °C)-98.5 °F (36.9 °C)] 98 °F (36.7 °C)  Pulse:  [] 63  Resp:  [10-24] 18  SpO2:  [91 %-100 %] 96 %  BP: (101-174)/() 103/61     Body mass index is 23.73 kg/m².           Drains       Drain  Duration                  Closed/Suction Drain 07/03/24 1239 Left Abdomen Bulb 15 Fr. <1 day         Urethral Catheter 07/03/24 0818 Double-lumen;Non-latex;Straight-tip 16 Fr. <1 day                     Physical Exam  Vitals reviewed.   Constitutional:       General: He is not in acute distress.     Appearance: He is well-developed. He is not diaphoretic.   HENT:      Head: Normocephalic and atraumatic.   Eyes:      General: No scleral icterus.  Cardiovascular:      Rate and Rhythm: Normal rate.   Pulmonary:      Effort: Pulmonary effort is normal. No respiratory distress.      Breath sounds: No stridor.   Abdominal:      General: There is no distension.      Palpations: Abdomen is soft.      Tenderness: There is no abdominal tenderness.      Comments: Incisions c/d/I  LLQ drain with SS output   Genitourinary:     Comments: Oliveros catheter in place draining clear yellow urine  Musculoskeletal:         General: Normal range of motion.      Cervical back: Normal range of motion.   Skin:     General: Skin is warm and dry.   Neurological:      Mental Status: He is alert.   Psychiatric:         Behavior: Behavior normal.           Significant Labs:    BMP:  Recent Labs   Lab 07/03/24  2240 07/04/24  0433    138   K 4.6 4.2    110   CO2 18* 23   BUN 13 11   CREATININE 1.1 1.0   CALCIUM 8.2* 8.0*       CBC:   Recent Labs   Lab 07/03/24  2139  "07/04/24  0433   WBC 11.82 9.07   HGB 13.0* 13.1*   HCT 39.8* 39.1*    196       Blood Culture: No results for input(s): "LABBLOO" in the last 168 hours.  Urine Culture: No results for input(s): "LABURIN" in the last 168 hours.  Urine Studies: No results for input(s): "COLORU", "APPEARANCEUA", "PHUR", "SPECGRAV", "PROTEINUA", "GLUCUA", "KETONESU", "BILIRUBINUA", "OCCULTUA", "NITRITE", "UROBILINOGEN", "LEUKOCYTESUR", "RBCUA", "WBCUA", "BACTERIA", "SQUAMEPITHEL", "HYALINECASTS" in the last 168 hours.    Invalid input(s): "WRIGHTSUR"    Significant Imaging:  All pertinent imaging results/findings from the past 24 hours have been reviewed.                "

## 2024-07-05 VITALS
RESPIRATION RATE: 18 BRPM | OXYGEN SATURATION: 99 % | BODY MASS INDEX: 23.7 KG/M2 | DIASTOLIC BLOOD PRESSURE: 76 MMHG | WEIGHT: 175 LBS | HEART RATE: 73 BPM | SYSTOLIC BLOOD PRESSURE: 124 MMHG | HEIGHT: 72 IN | TEMPERATURE: 99 F

## 2024-07-05 LAB
ANION GAP SERPL CALC-SCNC: 6 MMOL/L (ref 8–16)
BASOPHILS # BLD AUTO: 0.09 K/UL (ref 0–0.2)
BASOPHILS NFR BLD: 1 % (ref 0–1.9)
BODY FLUID SOURCE, CREATININE: NORMAL
BUN SERPL-MCNC: 8 MG/DL (ref 6–20)
CALCIUM SERPL-MCNC: 8.8 MG/DL (ref 8.7–10.5)
CHLORIDE SERPL-SCNC: 108 MMOL/L (ref 95–110)
CO2 SERPL-SCNC: 24 MMOL/L (ref 23–29)
CREAT FLD-MCNC: 15.8 MG/DL
CREAT SERPL-MCNC: 0.9 MG/DL (ref 0.5–1.4)
DIFFERENTIAL METHOD BLD: ABNORMAL
EOSINOPHIL # BLD AUTO: 0.4 K/UL (ref 0–0.5)
EOSINOPHIL NFR BLD: 4.3 % (ref 0–8)
ERYTHROCYTE [DISTWIDTH] IN BLOOD BY AUTOMATED COUNT: 12.7 % (ref 11.5–14.5)
EST. GFR  (NO RACE VARIABLE): >60 ML/MIN/1.73 M^2
GLUCOSE SERPL-MCNC: 99 MG/DL (ref 70–110)
HCT VFR BLD AUTO: 44.7 % (ref 40–54)
HGB BLD-MCNC: 14.2 G/DL (ref 14–18)
IMM GRANULOCYTES # BLD AUTO: 0.03 K/UL (ref 0–0.04)
IMM GRANULOCYTES NFR BLD AUTO: 0.3 % (ref 0–0.5)
LYMPHOCYTES # BLD AUTO: 1.2 K/UL (ref 1–4.8)
LYMPHOCYTES NFR BLD: 13 % (ref 18–48)
MCH RBC QN AUTO: 30.1 PG (ref 27–31)
MCHC RBC AUTO-ENTMCNC: 31.8 G/DL (ref 32–36)
MCV RBC AUTO: 95 FL (ref 82–98)
MONOCYTES # BLD AUTO: 0.9 K/UL (ref 0.3–1)
MONOCYTES NFR BLD: 9.6 % (ref 4–15)
NEUTROPHILS # BLD AUTO: 6.4 K/UL (ref 1.8–7.7)
NEUTROPHILS NFR BLD: 71.8 % (ref 38–73)
NRBC BLD-RTO: 0 /100 WBC
PLATELET # BLD AUTO: 221 K/UL (ref 150–450)
PMV BLD AUTO: 12.4 FL (ref 9.2–12.9)
POTASSIUM SERPL-SCNC: 4.4 MMOL/L (ref 3.5–5.1)
RBC # BLD AUTO: 4.72 M/UL (ref 4.6–6.2)
SODIUM SERPL-SCNC: 138 MMOL/L (ref 136–145)
WBC # BLD AUTO: 8.92 K/UL (ref 3.9–12.7)

## 2024-07-05 PROCEDURE — 36415 COLL VENOUS BLD VENIPUNCTURE: CPT | Performed by: STUDENT IN AN ORGANIZED HEALTH CARE EDUCATION/TRAINING PROGRAM

## 2024-07-05 PROCEDURE — 80048 BASIC METABOLIC PNL TOTAL CA: CPT | Performed by: STUDENT IN AN ORGANIZED HEALTH CARE EDUCATION/TRAINING PROGRAM

## 2024-07-05 PROCEDURE — 85025 COMPLETE CBC W/AUTO DIFF WBC: CPT | Performed by: STUDENT IN AN ORGANIZED HEALTH CARE EDUCATION/TRAINING PROGRAM

## 2024-07-05 PROCEDURE — 25000003 PHARM REV CODE 250: Performed by: STUDENT IN AN ORGANIZED HEALTH CARE EDUCATION/TRAINING PROGRAM

## 2024-07-05 PROCEDURE — 82570 ASSAY OF URINE CREATININE: CPT | Performed by: STUDENT IN AN ORGANIZED HEALTH CARE EDUCATION/TRAINING PROGRAM

## 2024-07-05 RX ORDER — OXYCODONE HYDROCHLORIDE 5 MG/1
5 TABLET ORAL EVERY 4 HOURS PRN
Qty: 10 TABLET | Refills: 0 | Status: SHIPPED | OUTPATIENT
Start: 2024-07-05

## 2024-07-05 RX ORDER — POLYETHYLENE GLYCOL 3350 17 G/17G
17 POWDER, FOR SOLUTION ORAL DAILY
Qty: 238 G | Refills: 0 | Status: SHIPPED | OUTPATIENT
Start: 2024-07-05 | End: 2024-07-19

## 2024-07-05 RX ORDER — ALPRAZOLAM 0.5 MG/1
0.5 TABLET ORAL NIGHTLY PRN
Status: DISCONTINUED | OUTPATIENT
Start: 2024-07-05 | End: 2024-07-05 | Stop reason: HOSPADM

## 2024-07-05 RX ORDER — DEXTROMETHORPHAN HYDROBROMIDE, GUAIFENESIN 5; 100 MG/5ML; MG/5ML
650 LIQUID ORAL EVERY 8 HOURS
Qty: 30 TABLET | Refills: 1 | Status: SHIPPED | OUTPATIENT
Start: 2024-07-05

## 2024-07-05 RX ADMIN — OXYCODONE HYDROCHLORIDE 5 MG: 5 TABLET ORAL at 04:07

## 2024-07-05 RX ADMIN — ACETAMINOPHEN 1000 MG: 500 TABLET ORAL at 04:07

## 2024-07-05 RX ADMIN — OXYCODONE HYDROCHLORIDE 5 MG: 5 TABLET ORAL at 12:07

## 2024-07-05 RX ADMIN — OXYCODONE HYDROCHLORIDE 5 MG: 5 TABLET ORAL at 08:07

## 2024-07-05 RX ADMIN — FAMOTIDINE 20 MG: 20 TABLET ORAL at 08:07

## 2024-07-05 NOTE — SUBJECTIVE & OBJECTIVE
Interval History: No acute events overnight. Vital signs stable. Minimal serosanguineous drain output overnight. This AM he states he says pain is well controlled but remains hesitant with taking deep breaths. Was counseled again on the important of IS.      Objective:     Temp:  [97.4 °F (36.3 °C)-98.2 °F (36.8 °C)] 98 °F (36.7 °C)  Pulse:  [53-62] 53  Resp:  [15-18] 15  SpO2:  [96 %-99 %] 99 %  BP: (106-120)/(57-73) 106/68     Body mass index is 23.73 kg/m².           Drains       Drain  Duration                  Closed/Suction Drain 07/03/24 1239 Left Abdomen Bulb 15 Fr. 1 day                     Physical Exam  Vitals reviewed.   Constitutional:       General: He is not in acute distress.     Appearance: He is well-developed. He is not diaphoretic.   HENT:      Head: Normocephalic and atraumatic.   Eyes:      General: No scleral icterus.  Cardiovascular:      Rate and Rhythm: Normal rate.   Pulmonary:      Effort: Pulmonary effort is normal. No respiratory distress.      Breath sounds: No stridor.   Abdominal:      General: There is no distension.      Palpations: Abdomen is soft.      Tenderness: There is no abdominal tenderness.      Comments: Incisions c/d/I  LLQ drain with SS output   Musculoskeletal:         General: Normal range of motion.      Cervical back: Normal range of motion.   Skin:     General: Skin is warm and dry.   Neurological:      Mental Status: He is alert.   Psychiatric:         Behavior: Behavior normal.           Significant Labs:    BMP:  Recent Labs   Lab 07/03/24  2240 07/04/24  0433 07/05/24  0432    138 138   K 4.6 4.2 4.4    110 108   CO2 18* 23 24   BUN 13 11 8   CREATININE 1.1 1.0 0.9   CALCIUM 8.2* 8.0* 8.8       CBC:   Recent Labs   Lab 07/03/24  2139 07/04/24  0433 07/05/24  0432   WBC 11.82 9.07 8.92   HGB 13.0* 13.1* 14.2   HCT 39.8* 39.1* 44.7    196 221       All pertinent labs results from the past 24 hours have been reviewed.    Significant  Imaging:  All pertinent imaging results/findings from the past 24 hours have been reviewed.

## 2024-07-05 NOTE — PROGRESS NOTES
[facility]  Urology  Progress Note    Patient Name: Yasir Coe Jr.  MRN: [id number]  Admission Date: 7/3/2024  Hospital Length of Stay: 2 days  Code Status: Full Code   Attending Provider: Giancarlo Garnica MD   Primary Care Physician: Oli Hendrickson MD    Subjective:     HPI:  Patient is a 43 yo M with a history of L renal mass s/p robotic partial nephrectomy on 7/3/24.     Interval History: No acute events overnight. Vital signs stable. Minimal serosanguineous drain output overnight. This AM he states he says pain is well controlled but remains hesitant with taking deep breaths. Was counseled again on the important of IS.      Objective:     Temp:  [97.4 °F (36.3 °C)-98.2 °F (36.8 °C)] 98 °F (36.7 °C)  Pulse:  [53-62] 53  Resp:  [15-18] 15  SpO2:  [96 %-99 %] 99 %  BP: (106-120)/(57-73) 106/68     Body mass index is 23.73 kg/m².           Drains       Drain  Duration                  Closed/Suction Drain 07/03/24 1239 Left Abdomen Bulb 15 Fr. 1 day                     Physical Exam  Vitals reviewed.   Constitutional:       General: He is not in acute distress.     Appearance: He is well-developed. He is not diaphoretic.   HENT:      Head: Normocephalic and atraumatic.   Eyes:      General: No scleral icterus.  Cardiovascular:      Rate and Rhythm: Normal rate.   Pulmonary:      Effort: Pulmonary effort is normal. No respiratory distress.      Breath sounds: No stridor.   Abdominal:      General: There is no distension.      Palpations: Abdomen is soft.      Tenderness: There is no abdominal tenderness.      Comments: Incisions c/d/I  LLQ drain with SS output   Musculoskeletal:         General: Normal range of motion.      Cervical back: Normal range of motion.   Skin:     General: Skin is warm and dry.   Neurological:      Mental Status: He is alert.   Psychiatric:         Behavior: Behavior normal.           Significant Labs:    BMP:  Recent Labs   Lab 07/03/24  2240 07/04/24  0433 07/05/24  0432   NA  136 138 138   K 4.6 4.2 4.4    110 108   CO2 18* 23 24   BUN 13 11 8   CREATININE 1.1 1.0 0.9   CALCIUM 8.2* 8.0* 8.8       CBC:   Recent Labs   Lab 07/03/24  2139 07/04/24  0433 07/05/24  0432   WBC 11.82 9.07 8.92   HGB 13.0* 13.1* 14.2   HCT 39.8* 39.1* 44.7    196 221       All pertinent labs results from the past 24 hours have been reviewed.    Significant Imaging:  All pertinent imaging results/findings from the past 24 hours have been reviewed.                  Assessment/Plan:     Left renal mass  - Continue regular diet  - Miralax daily prn constipation  - Heparin held  - Maintain SCDs  - Continue prn pain and nausea control  - Continue to encourage ambulation  - Continue IS use  - voiding normally  - remove drain before discharge  - ESTEFANY cr negative    Dispo: Home today with discharge instructions           VTE Risk Mitigation (From admission, onward)           Ordered     Place CRYSTAL hose  Until discontinued         07/03/24 0610     Place sequential compression device  Until discontinued         07/03/24 0610                    Ken Montgomery MD  Urology  [facility] - Surgery

## 2024-07-05 NOTE — DISCHARGE SUMMARY
[facility]  Urology  Discharge Summary      Patient Name: Yasir Coe Jr.  MRN: [id number]  Admission Date: 7/3/2024  Hospital Length of Stay: 2 days  Discharge Date and Time: No discharge date for patient encounter.  Attending Physician: Giancarlo Garnica MD   Discharging Provider: Jah Fischer MD  Primary Care Physician: Oli Hendrickson MD    HPI:   Patient is a 45 yo M with a history of L renal mass s/p robotic partial nephrectomy on 7/3/24.     Procedure(s) (LRB):  DV5 ROBOTIC NEPHRECTOMY PARTIAL (Left)     Indwelling Lines/Drains at time of discharge:   Lines/Drains/Airways       Drain  Duration                  Closed/Suction Drain 07/03/24 1239 Left Abdomen Bulb 15 Fr. 2 days                    Hospital Course The [facility] the above procedure. Patient tolerated the procedure well in its entirety without issue. For more details, please refer to the complete operative note. he was transferred to recovery post-op and then to the floor. Once on the floor his diet was advanced and he ambulated the night of and day after surgery. On POD 1 the patient was tolerating a regular diet, ambulating without difficulty.his pain was well controlled.    Physical exam was appropriate for post operative state. The incisions were clean, dry and intact.  The ralph was removed and he was able to void without difficulty. ESTEFANY drain was kept on discharge. The patient was deemed stable for discharge on 07/05/2024.  .      Goals of Care Treatment Preferences:  Code Status: Full Code      Consults: none    Significant Diagnostic Studies:     Pending Diagnostic Studies:       Procedure Component Value Units Date/Time    Specimen to Pathology, Surgery Urology [[id number]] Collected: 07/03/24 1248    Order Status: Sent Lab Status: In process Updated: 07/03/24 1345    Specimen: Tissue             Final Active Diagnoses:    Diagnosis Date Noted POA    PRINCIPAL PROBLEM:  Left renal mass [N28.89] 06/20/2024  Yes      Problems Resolved During this Admission:         Discharged Condition: good    Disposition: home    Follow Up: in clinic    Patient Instructions:      Basic Metabolic Panel   Standing Status: Future Number of Occurrences: 1 Standing Exp. Date: 08/13/25     Type & Screen   Standing Status: Future Number of Occurrences: 1 Standing Exp. Date: 08/13/25     Medications:  Reconciled Home Medications:      Medication List        START taking these medications      acetaminophen 650 MG Tbsr  Commonly known as: TYLENOL  Take 1 tablet (650 mg total) by mouth every 8 (eight) hours.     oxyCODONE 5 MG immediate release tablet  Commonly known as: ROXICODONE  Take 1 tablet (5 mg total) by mouth every 4 (four) hours as needed for Pain.     polyethylene glycol 17 gram/dose powder  Commonly known as: GLYCOLAX  Use to cap to measure 17g, mix with liquid, and take by mouth once daily for 14 days            CONTINUE taking these medications      ALPRAZolam 0.5 MG tablet  Commonly known as: XANAX  Take 0.5 mg by mouth nightly as needed for Anxiety.     bacitracin 500 unit/gram Oint  Apply topically 2 (two) times daily.     testosterone cypionate 200 mg/mL injection  Commonly known as: DEPOTESTOTERONE CYPIONATE     testosterone enanthate 200 mg/mL injection  Commonly known as: DELATESTRYL  Inject into the muscle every 14 (fourteen) days. Pt takes testosterone 2 x's a week              Time spent on the discharge of patient: 20 minutes    Jah Fischer MD  Urology  [facility] - Surgery

## 2024-07-05 NOTE — PLAN OF CARE
[facility]  Discharge Final Note    Primary Care Provider: Oli Hendrickson MD    Expected Discharge Date: 7/5/2024    Final Discharge Note (most recent)       Final Note - 07/05/24 1412          Final Note    Assessment Type Final Discharge Note     Anticipated Discharge Disposition Home or Self Care     Hospital Resources/Appts/Education Provided Provided patient/caregiver with written discharge plan information;Provided education on problems/symptoms using teachback                   Future Appointments   Date Time Provider Department Center   7/12/2024  9:45 AM Giancarlo Garnica MD [facility] UROLOG [facility] Chavez

## 2024-07-05 NOTE — DISCHARGE INSTRUCTIONS
What to expect with your Partial Nephrectomy.  [facility]    After surgery  You may or may not have a drain that is shaped like a grenade and put to suction  This drain usually may or may not come out on Post op day 1. If you go home with a drain, the nurses will teach you how to record the output and you will come back 3-5 days after you leave to have the drain removed in clinic.  You will be on bedrest overnight. The next morning we will come by and see you and take you off bedrest sometime mid morning, that is when your catheter will come out.   You will have a catheter after your surgery. It should come out the next day and you should be able to void on your own before you leave. If you are a male and on a BPH medicine, we will need to make sure you restart that the night of your surgery.  The night of surgery we expect and hope that you will:  Eat - you do not have to eat a whole meal, but we want to make sure you can tolerate liquid and/or solid food without nausea and vomiting  Use your incentive spirometer - this is the breathing apparatus that helps you expand your lungs. If and when you have pain you will not want to take deep breaths. But if you dont take deep breaths, you are at risk for pneumonia. The incentive spirometer will help prevent this from occurring by expanding your lungs.  Symptoms you may experience immediately post-op:  Bloating and/or shoulder pain if you had a laparascopic procedure- when we do this operation, we fill up your abdominal cavity with gas to better help us visualize the organs and allow our instruments to fit. After the surgery, not all the air can be removed and your body will eventually absorb this small amount of air. However this can make you feel bloated. In addition, when you sit up, the air can sit right under a muscle (the diaphragm) which has connecting nerves to the shoulders, which could explain why you have shoulder pain.  Do not expect necessarily to have  gas or to have a bowel movement - this goes along with the bloating, you may feel like you want to pass gas or have a bowel movement but you cant. This is normal and you will feel like this for a couple days. There are no pills to help with this. Small walks throughout the day should help with this.  Pain - your pain should be able to be controlled with medicines by mouth that we prescribe. It is important for you to tell us if you are on any pain medications at home before the surgery as you may need stronger pain meds while in the hospital.  You can go home when:  Pain is controlled with medicines by mouth  You are able to walk without difficulty or pain  You are tolerating a regulating diet  Your are voiding. If you cannot void we may have to replace a catheter and you will follow up 3-5 days after you leave to have a voiding trial. The nurses will teach you how to care for your catheter.  When you go home:  Blood pressure  Your blood pressure must be well controlled (<140 systolic blood pressure), if youre blood pressure is not controlled, there is an increased risk of bleeding.  Activity  Continue to walk - small walks throughout the day are better than one long walk.   Do not lift anything greater than 8 pounds for 6 weeks - we want your abdominal wall muscles to heal.  Bowel Movements - Do not strain to have a bowel movement - the pain medicines will make you constipated. That is why we also ask you to take colace 2-3 x per day to help keep your bowels regular. If you are still having trouble, then you can also add Miralax once a day. Do not take any stool softeners if you are having diarrhea.  Drain - If you have a drain (not your catheter, but a separate drain) then record the output and bring it with you to your next appointment  Smoking - If you smoke, we encourage you to STOP. Smoking interferes with the healing process and your prolong your healing with continued smoking.  Driving - Do not drive while  "you are on pain meds or with your catheter in place.  Bathing - If you do not have a drain, you can shower 48 hours after your surgery. If you do have a drain, sponge bathe only until the drain is out.  Dressing - you can remove the dressings if there is no drainage or change them as needed if there is. The little sterile band-aid strips will fall off on their own in 10-14 days. If they have not fallen off then you can remove them yourself.  Restarting medicines -especially blood thinners (Aspirin, Plavix, Coumadin), Fish Oil. Discuss this with your physician prior to discharge.  When to return to the ER  Fever - If you have a fever >101.5. This could be due to a number of reasons such as infection of the urine or incision. If your catheter has been removed, you could possibly have a leak. It would be best to come to the ER so they can better evaluate you.  Severe pain - pain is expected, but severe or new onset of pain is not normal.   Inability to tolerate food or liquid with nausea and vomiting - it would be best to go to the ER for them to better evaluate you.     You are being discharged home with a drain, please keep the drain and record the outputs.    Drain Care Instructions  You will be discharged home with your drain.  If you are having discharge from around your drain at the insertion point, you can place dry guaze and tape in place, replacing as needed.  Sometimes the drain needs to be "stripped." Please use the technique discussed while you were hospitalized in order to "strip" your drain.  Make sure that you record the output and the characteristic of the discharge twice daily (example: 50mL of yellow-red output), as you were instructed during your hospitalization.  Please bring this information with you to your next clinic appointment.      "

## 2024-07-05 NOTE — PLAN OF CARE
Problem: Adult Inpatient Plan of Care  Goal: Plan of Care Review  Outcome: Progressing  Goal: Patient-Specific Goal (Individualized)  Outcome: Progressing  Goal: Absence of Hospital-Acquired Illness or Injury  Outcome: Progressing  Goal: Optimal Comfort and Wellbeing  Outcome: Progressing  Goal: Readiness for Transition of Care  Outcome: Progressing     Problem: Adult Inpatient Plan of Care  Goal: Plan of Care Review  Outcome: Progressing     Problem: Adult Inpatient Plan of Care  Goal: Patient-Specific Goal (Individualized)  Outcome: Progressing     Problem: Adult Inpatient Plan of Care  Goal: Absence of Hospital-Acquired Illness or Injury  Outcome: Progressing     Problem: Adult Inpatient Plan of Care  Goal: Optimal Comfort and Wellbeing  Outcome: Progressing     Problem: Adult Inpatient Plan of Care  Goal: Readiness for Transition of Care  Outcome: Progressing     Problem: Infection  Goal: Absence of Infection Signs and Symptoms  Outcome: Progressing   Remain Jamarcus at times while resting. PRN medication effective for pain explained plan of care, verbalized understanding. Educated pt .on new medicine . No injury during shift, Side rails up x 2, call light by bedside. Ambulated hallways

## 2024-07-05 NOTE — ASSESSMENT & PLAN NOTE
- Continue regular diet  - Miralax daily prn constipation  - Heparin held  - Maintain SCDs  - Continue prn pain and nausea control  - Continue to encourage ambulation  - Continue IS use  - voiding normally  - remove drain before discharge  - ESTEFANY cr negative    Dispo: Home today with discharge instructions

## 2024-07-05 NOTE — NURSING
Nurses Note -- 4 Eyes      7/5/2024   9:11 AM      Skin assessed during: Daily Assessment      [x] No Altered Skin Integrity Present    []Prevention Measures Documented      [] Yes- Altered Skin Integrity Present or Discovered   [] LDA Added if Not in Epic (Describe Wound)   [] New Altered Skin Integrity was Present on Admit and Documented in LDA   [] Wound Image Taken    Wound Care Consulted? No    Attending Nurse:  Odalis Stinson RN/Staff Member:   Luz Maria

## 2024-07-08 ENCOUNTER — OFFICE VISIT (OUTPATIENT)
Dept: UROLOGY | Facility: CLINIC | Age: 44
End: 2024-07-08
Payer: COMMERCIAL

## 2024-07-08 DIAGNOSIS — N28.89 LEFT RENAL MASS: Primary | ICD-10-CM

## 2024-07-08 PROCEDURE — 99999 PR PBB SHADOW E&M-EST. PATIENT-LVL I: CPT | Mod: PBBFAC,,, | Performed by: STUDENT IN AN ORGANIZED HEALTH CARE EDUCATION/TRAINING PROGRAM

## 2024-07-08 PROCEDURE — 99024 POSTOP FOLLOW-UP VISIT: CPT | Mod: ,,, | Performed by: STUDENT IN AN ORGANIZED HEALTH CARE EDUCATION/TRAINING PROGRAM

## 2024-07-08 NOTE — PROGRESS NOTES
IRB# [id number]  Braulio Renal Protocol  Date: July 3, 2024    Specimen ( renal mass, fat, blood) was retrieved from OR 23.   Specimen was brought to pathology where a slice of normal and tumor was taken per Braulio protocol and brought to Dr. Ramsey's lab along with the blood.     Nicki Maldonado RN

## 2024-07-08 NOTE — PROGRESS NOTES
Patient examined.  Drainage tubes intact.  Drains removed by Dr Garnica, dressing applied.  Wound care discussed.

## 2024-07-09 ENCOUNTER — PATIENT MESSAGE (OUTPATIENT)
Dept: UROLOGY | Facility: CLINIC | Age: 44
End: 2024-07-09

## 2024-07-10 LAB
FINAL PATHOLOGIC DIAGNOSIS: NORMAL
GROSS: NORMAL
Lab: NORMAL

## 2024-07-10 RX ORDER — OXYCODONE HYDROCHLORIDE 5 MG/1
5 TABLET ORAL 3 TIMES DAILY PRN
Qty: 9 TABLET | Refills: 0 | Status: SHIPPED | OUTPATIENT
Start: 2024-07-10 | End: 2024-07-13

## 2024-07-12 ENCOUNTER — OFFICE VISIT (OUTPATIENT)
Dept: UROLOGY | Facility: CLINIC | Age: 44
End: 2024-07-12
Payer: COMMERCIAL

## 2024-07-12 ENCOUNTER — PATIENT MESSAGE (OUTPATIENT)
Dept: HEMATOLOGY/ONCOLOGY | Facility: CLINIC | Age: 44
End: 2024-07-12

## 2024-07-12 VITALS
HEIGHT: 72 IN | BODY MASS INDEX: 22.66 KG/M2 | WEIGHT: 167.31 LBS | DIASTOLIC BLOOD PRESSURE: 77 MMHG | SYSTOLIC BLOOD PRESSURE: 117 MMHG | HEART RATE: 80 BPM

## 2024-07-12 DIAGNOSIS — C64.9 PAPILLARY RENAL CELL CARCINOMA: ICD-10-CM

## 2024-07-12 DIAGNOSIS — N28.89 LEFT RENAL MASS: Primary | ICD-10-CM

## 2024-07-12 PROCEDURE — 99999 PR PBB SHADOW E&M-EST. PATIENT-LVL III: CPT | Mod: PBBFAC,,, | Performed by: STUDENT IN AN ORGANIZED HEALTH CARE EDUCATION/TRAINING PROGRAM

## 2024-07-12 NOTE — PROGRESS NOTES
[facility]  Urologic Oncology Clinic Note        Date of Service: 7/12/2024        Chief Complaint/Reason for Consultation: Left Renal Mass , Right Renal Cyst    Requesting Provider:   No referring provider defined for this encounter.      History of Present Illness:   Patient 44 y.o. male presents with incidental finding of left renal mass that is enhancing and concerning for malignancy on CT scan performed for epigastric pain.     He has no family hx of kidney cancer.    Takes testosterone once a week  Xanax    Otherwise healthy, active, no constitutional complaints. No hematuria.     Blood thinners:  None    No Hx of  TIA, CVA, MI    Abdominal surgeries:  None      He is now s/p left robotic partial nephrectomy on 7/3/2024 that was completed without complication. He was sent home with ESTEFANY drain in place due to elevated Cr from drain at time of discharge. He had drain removed one week later. He is back today for pathology review.    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       Review of patient's allergies indicates:  No Known Allergies     Past Medical History:   Diagnosis Date    Anxiety     GERD (gastroesophageal reflux disease)     improved with dietary changes      Past Surgical History:   Procedure Laterality Date    DV5 ROBOTIC NEPHRECTOMY Left 7/3/2024    Procedure: DV5 ROBOTIC NEPHRECTOMY PARTIAL;  Surgeon: Giancarlo Garnica MD;  Location: [facility] OR [address] [place] [address];  Service: Urology;  Laterality: Left;  type/screen, B&K Ultrasound, Surgery clearance    FRACTURE SURGERY      bilateral arm      Family History   Problem Relation Name Age of Onset    No Known Problems Mother      Heart disease Father      No Known Problems Sister        Social History      Tobacco Use    Smoking status: Never    Smokeless tobacco: Never   Substance Use Topics    Alcohol use: Yes     Alcohol/week: 3.0 - 6.0 standard drinks of alcohol     Types: 3 - 6 Shots of liquor per week        Review of Systems   Constitutional:  Negative for activity change, fatigue and fever.   HENT:  Negative for congestion.    Respiratory:  Negative for cough.    Cardiovascular:  Negative for chest pain.   Genitourinary:  Negative for hematuria.             OBJECTIVE:     Vitals:    07/12/24 0954   BP: 117/77   Pulse: 80   Weight: 75.9 kg (167 lb 5.3 oz)   Height: 6' (1.829 m)            General Appearance: Alert, cooperative, no distress  Head: Normocephalic  Eyes: Clear conjunctiva  Neck: No obvious LND or JVD  Lungs: Normal chest excursion, no accessory muscle use  Chest: Regular rate rhythm by palpation, no pedal edema  Abdomen: Soft, non-tender, non-distended, surgical scars healing well, hernias none  Extremities: Atraumatic   Lymph Nodes: No appreciable lymph adenopathy  Neurologic: No gross gait, motor or sensory deficits            LAB:        CMP  Sodium   Date Value Ref Range Status   07/05/2024 138 136 - 145 mmol/L Final     Potassium   Date Value Ref Range Status   07/05/2024 4.4 3.5 - 5.1 mmol/L Final     Chloride   Date Value Ref Range Status   07/05/2024 108 95 - 110 mmol/L Final     CO2   Date Value Ref Range Status   07/05/2024 24 23 - 29 mmol/L Final     Glucose   Date Value Ref Range Status   07/05/2024 99 70 - 110 mg/dL Final     BUN   Date Value Ref Range Status   07/05/2024 8 6 - 20 mg/dL Final     Creatinine   Date Value Ref Range Status   07/05/2024 0.9 0.5 - 1.4 mg/dL Final     Calcium   Date Value Ref Range Status   07/05/2024 8.8 8.7 - 10.5 mg/dL Final     Total Protein   Date Value Ref Range Status   05/11/2024 7.4 6.0 - 8.4 g/dL Final     Albumin   Date Value Ref Range Status   05/11/2024 3.6 3.5 - 5.2 g/dL Final     Total Bilirubin   Date Value Ref Range Status   05/11/2024  0.3 0.1 - 1.0 mg/dL Final     Comment:     For infants and newborns, interpretation of results should be based  on gestational age, weight and in agreement with clinical  observations.    Premature Infant recommended reference ranges:  Up to 24 hours.............<8.0 mg/dL  Up to 48 hours............<12.0 mg/dL  3-5 days..................<15.0 mg/dL  6-29 days.................<15.0 mg/dL    For patients on Eltrombopag therapy, use of Dimension [place] TBIL is   not   recommended.       Alkaline Phosphatase   Date Value Ref Range Status   05/11/2024 54 (L) 55 - 135 U/L Final     AST   Date Value Ref Range Status   05/11/2024 24 10 - 40 U/L Final     ALT   Date Value Ref Range Status   05/11/2024 28 10 - 44 U/L Final     Anion Gap   Date Value Ref Range Status   07/05/2024 6 (L) 8 - 16 mmol/L Final     eGFR   Date Value Ref Range Status   07/05/2024 >60.0 >60 mL/min/1.73 m^2 Final     Lab Results   Component Value Date    WBC 8.92 07/05/2024    HGB 14.2 07/05/2024    HCT 44.7 07/05/2024    MCV 95 07/05/2024     07/05/2024           IMAGING:      No relevant imaging to review        ASSESSMENT/PLAN:     43 yo M with hx of papillary renal rosalie carcinoma here in follow up after recent robotic left partial nephrectomy    Plan:    Papillary Renal Rosalie Carcinoma, Left Kidney  - Discussed final pathology, and that surgery deemed curative at this point  - Discussed NCCN guidelines for surveillance  - CT AP w/ and w.o contrast in 6 months  - CXR yearly   - Will send patient to genetics for evaluation      Giancarlo Garnica MD  Urologic Oncology  P: [phone]

## 2024-08-02 ENCOUNTER — PATIENT MESSAGE (OUTPATIENT)
Dept: ADMINISTRATIVE | Facility: HOSPITAL | Age: 44
End: 2024-08-02

## 2024-08-13 ENCOUNTER — PATIENT MESSAGE (OUTPATIENT)
Dept: ADMINISTRATIVE | Facility: OTHER | Age: 44
End: 2024-08-13

## 2024-08-28 NOTE — PROGRESS NOTES
2024       Seema Dawson MD  [address] [facility] [address]  Via In Basket      Patient: Lynette Gonzalez   YOB: 1953   Date of Visit: 2024       Dear Dr. Dawson:    Thank you for referring Lynette Gonzalez to me for evaluation. Below are my notes for this visit with her.    If you have questions, please do not hesitate to call me. I look forward to following your patient along with you.      Sincerely,        Scott Camejo MD        CC: No Recipients  Scott Camejo MD  2024 10:23 AM  Signed       Cardiology Clinic Follow-Up Visit  [address]  [address]  [facility] [address]  Dept Phone: [phone]      Lynette Gonzalez  : 1953  PCP: Seema Dawson MD    History of Present Illness:  Dear Seema Smith MD, We had the pleasure of seeing Lynette Gonzalez in the [facility]. The patient is a 71 year old female who returns to office with the chief complaint of Follow-up (No cardiac complaints today.)  . The patient is a young female with a history of hypertension and hyperlipidemia. The patient has underlying hypertension. In the past the patient was noted to have chest pain syndrome. Her stress testing showed normal contractility, normal EKG response to exertion and normal radioisotope distribution. No evidence of ischemia nor previous myocardial injury was noted. Her blood pressure was initially febrile and then slightly elevated on a secondary assessment.     Since her last office visit the patient has been judicious with her valsartan dosing.  She should continue she has continued the same.  The patient's blood pressures show a normotensive response to that medication.  She has a negative cardiac review of systems.    Problem List Items Addressed This Visit          Cardiac and Vasculature    Mixed hyperlipidemia    Essential hypertension - Primary    Palpitations  [facility] - Surgery  Urology  Progress Note    Patient Name: Yasir Coe Jr.  MRN: [id number]  Admission Date: 7/3/2024  Hospital Length of Stay: 1 days  Code Status: Full Code   Attending Provider: Giancarlo Garnica MD   Primary Care Physician: Oli Hendrickson MD    Subjective:     HPI:  Patient is a 45 yo M with a history of L renal mass s/p robotic partial nephrectomy on 7/3/24.     Interval History: Some concerns with hematuria and sanguineous drain output overnight. Labs repeated, patient made NPO in anticipation of possible procedure this AM. However, this AM he states he feels phenomenal. The character of both his drain and catheter have drastically improved. He has ambulated and eaten without issue.    Objective:     Temp:  [96.5 °F (35.8 °C)-98.5 °F (36.9 °C)] 98 °F (36.7 °C)  Pulse:  [] 63  Resp:  [10-24] 18  SpO2:  [91 %-100 %] 96 %  BP: (101-174)/() 103/61     Body mass index is 23.73 kg/m².           Drains       Drain  Duration                  Closed/Suction Drain 07/03/24 1239 Left Abdomen Bulb 15 Fr. <1 day         Urethral Catheter 07/03/24 0818 Double-lumen;Non-latex;Straight-tip 16 Fr. <1 day                     Physical Exam  Vitals reviewed.   Constitutional:       General: He is not in acute distress.     Appearance: He is well-developed. He is not diaphoretic.   HENT:      Head: Normocephalic and atraumatic.   Eyes:      General: No scleral icterus.  Cardiovascular:      Rate and Rhythm: Normal rate.   Pulmonary:      Effort: Pulmonary effort is normal. No respiratory distress.      Breath sounds: No stridor.   Abdominal:      General: There is no distension.      Palpations: Abdomen is soft.      Tenderness: There is no abdominal tenderness.      Comments: Incisions c/d/I  LLQ drain with SS output   Genitourinary:     Comments: Oliveros catheter in place draining clear yellow urine  Musculoskeletal:         General: Normal range of motion.      Cervical back: Normal range of         Assessment/Plan:  The patient is doing quite well.  Her blood pressures appear quite stable at home and in the office.  She should remain on her valsartan.  She continues on rosuvastatin and fenofibrate for cholesterol management.  The patient is asked to have a fasting lipid panel in December.  The patient should return to the office in April 2025.  Further recommendations are depend upon clinical course.  It is a pleasure to be involved in her care.    Travel safely to [place] in the wintertime.    Scott Camejo MD FACC    I personally reviewed the patient's history, laboratory data and cardiac diagnostic testing  Previous notes were reviewed and taken into consideration. The informational was utilized to establish my current plan of care.     The case content and decision making were discussed with the patient.    Aníbal LOPEZ [facility]    Review of Systems:  A 12-point Review of Systems was performed and is negative except for HPI.     Physical Exam:  Visit Vitals  /82 (BP Location: LUE - Left upper extremity, Patient Position: Sitting, Cuff Size: Large Adult)   Pulse 67   Resp 16   Ht 5' (1.524 m)   Wt 86 kg (189 lb 9.5 oz)   BMI 37.03 kg/m²     Wt Readings from Last 4 Encounters:   08/28/24 86 kg (189 lb 9.5 oz)   06/04/24 84.4 kg (186 lb)   04/26/24 84.8 kg (186 lb 15.2 oz)   04/23/24 86.8 kg (191 lb 4.8 oz)     Vital signs and previous weights were reviewed during today's visit.    General appearance: alert, cooperative, and no distress  Head: Normocephalic, without obvious abnormality, atraumatic  Neck: no JVD  Lungs: clear to auscultation bilaterally  Chest wall: no tenderness  Heart: regular rate and rhythm  Abdomen: Soft, non-tender, and bowel sounds normal  Extremities: extremities normal, atraumatic, no cyanosis or edema  Pulses: 2+ and symmetric  Neurologic: Alert and oriented x3  no focal normal deficit    Past Medical History  Past Medical History:   Diagnosis Date   • Allergic rhinitis   "motion.   Skin:     General: Skin is warm and dry.   Neurological:      Mental Status: He is alert.   Psychiatric:         Behavior: Behavior normal.           Significant Labs:    BMP:  Recent Labs   Lab 07/03/24  2240 07/04/24  0433    138   K 4.6 4.2    110   CO2 18* 23   BUN 13 11   CREATININE 1.1 1.0   CALCIUM 8.2* 8.0*       CBC:   Recent Labs   Lab 07/03/24  2139 07/04/24  0433   WBC 11.82 9.07   HGB 13.0* 13.1*   HCT 39.8* 39.1*    196       Blood Culture: No results for input(s): "LABBLOO" in the last 168 hours.  Urine Culture: No results for input(s): "LABURIN" in the last 168 hours.  Urine Studies: No results for input(s): "COLORU", "APPEARANCEUA", "PHUR", "SPECGRAV", "PROTEINUA", "GLUCUA", "KETONESU", "BILIRUBINUA", "OCCULTUA", "NITRITE", "UROBILINOGEN", "LEUKOCYTESUR", "RBCUA", "WBCUA", "BACTERIA", "SQUAMEPITHEL", "HYALINECASTS" in the last 168 hours.    Invalid input(s): "WRIGHTSUR"    Significant Imaging:  All pertinent imaging results/findings from the past 24 hours have been reviewed.                  Assessment/Plan:     Left renal mass  - Continue regular diet  - Miralax daily prn constipation  - Heparin held  - Maintain SCDs  - Continue prn pain and nausea control  - Continue to encourage ambulation  - Continue IS use  - d/c mIVF   - ralph out, voiding trial to follow    Dispo: remain inpatient today. Voiding trial today. Will continue to monitor drain character and output, likely repeat ESTEFANY Cr tomorrow and plan for removal prior to d/c.          VTE Risk Mitigation (From admission, onward)           Ordered     Place CRYSTAL hose  Until discontinued         07/03/24 0610     Place sequential compression device  Until discontinued         07/03/24 0610                    Juan Gonzalez MD  Urology  [facility] - Surgery  "   • Arthritis    • Chronic rhinitis    • Colon polyps    • Coronary artery disease    • Deviated nasal septum    • Diabetes mellitus  (CMD)     pre diabetes/ hyperglycemia   • Dyslipidemia    • Essential (primary) hypertension    • GERD (gastroesophageal reflux disease)    • High cholesterol    • [place] (hard of hearing)    • Hurthle cell neoplasm of thyroid    • Hx of Chronic cervicitis 1982   • Hx of Enthesopathy    • Hx of Leiomyoma    • Hyperlipoproteinemia    • IBS (irritable bowel syndrome)    • Neoplasm of uncertain behavior of skin    •  (normal spontaneous vaginal delivery) (CMD)     x4   • Parathyroid adenoma    • Pregnancy (CMD)        • Rash 2020   • Thyroid condition     left thyroid nodule   • TMJ click        Past Cardiovascular and Surgical History  Past Surgical History:   Procedure Laterality Date   • Bunionectomy Bilateral    • Colonoscopy  2013    WNL 10yr repeat F/U   • Colonoscopy w/ polypectomy  2023    repeat 3 years   • Dexa bone density axial skeleton  2014    WNL   • Esophagogastroduodenoscopy     • Gynecologic cryosurgery     • Liposuction     • Thyroidectomy Left 2020   • Tonsillectomy         Family History  Family History   Problem Relation Age of Onset   • Osteoarthritis Mother    • Diabetes Mother    • Hypertension Mother    • Obesity Mother    • Hyperlipidemia Mother    • Heart Mother    • Diabetes Sister    • Other Other         Cardiac disorder       Social History  Social History     Tobacco Use   • Smoking status: Never   • Smokeless tobacco: Never   Vaping Use   • Vaping status: never used   Substance Use Topics   • Alcohol use: Yes     Comment: SOCIAL   • Drug use: No        Allergies  ALLERGIES:   Allergen Reactions   • Amoxicillin ANXIETY and HIVES   • Cat Dander PRURITUS     Itchy eyes   • Iodine   (Environmental Or Med) RASH   • Lisinopril Cough        Medications  Current Medications    CINNAMON 500 MG CAP    Take 2,000 mg by  mouth daily.     FENOFIBRATE (TRICOR) 145 MG TABLET    Take 1 tablet by mouth daily.    LEVOCETIRIZINE (XYZAL) 5 MG TABLET    Take 1 tablet by mouth daily.    MONTELUKAST (SINGULAIR) 10 MG TABLET    Take 1 tablet by mouth nightly.    MULTIPLE VITAMINS-MINERALS (MULTIVITAMIN WOMEN 50+) TAB    Take by mouth daily.    NEOMYCIN-POLYMYXIN B-DEXAMETHASONE (MAXITROL) 3.5-80776-8.1 OPHTHALMIC OINTMENT    Place into both eyes as needed (Place in both eyes as needed). Place in both eyes as needed    OMEGA-3 FATTY ACIDS (FISH OIL) 1000 MG CAPSULE    Take 1 g by mouth daily.    OMEPRAZOLE (PRILOSEC) 40 MG CAPSULE        ROSUVASTATIN (CRESTOR) 10 MG TABLET    Take 1 tablet by mouth daily.    VALSARTAN (DIOVAN) 320 MG TABLET    Take 1 tablet by mouth daily.       Problem List:  Patient Active Problem List   Diagnosis   • Chronic GERD   • Elevated glucose   • Mixed hyperlipidemia   • Postmenopausal   • Dyspareunia, female   • Environmental allergies   • Essential hypertension   • Palpitations   • Thyroid nodule   • Dizziness   • Anxiety       Return to Clinic: Return in about 6 months (around 2/28/2025) for With Dr Camejo. Patient instructed to have all ordered testing prior to follow-up visit.     Orders Placed During This Encounter:  No orders of the defined types were placed in this encounter.         Thank you for allowing me to see this patient today. Please feel free to call our office with any questions.     Thank you for this consult.   Scott Camejo MD, FACC  [facility] - Cardiology  [facility]  Case discussed and coordinated with Treatment Team

## 2024-08-30 ENCOUNTER — TELEPHONE (OUTPATIENT)
Dept: HEMATOLOGY/ONCOLOGY | Facility: CLINIC | Age: 44
End: 2024-08-30

## 2024-08-30 NOTE — TELEPHONE ENCOUNTER
Called patient and left message to confirm appointment on 9/6/24 and to complete the genetic questionnaire 2 days prior to the visit.

## 2024-09-05 ENCOUNTER — TELEPHONE (OUTPATIENT)
Dept: HEMATOLOGY/ONCOLOGY | Facility: CLINIC | Age: 44
End: 2024-09-05

## 2024-09-05 ENCOUNTER — PATIENT MESSAGE (OUTPATIENT)
Dept: HEMATOLOGY/ONCOLOGY | Facility: CLINIC | Age: 44
End: 2024-09-05

## 2024-09-05 NOTE — TELEPHONE ENCOUNTER
Called and spoke to patient to confirm appointment on 9/6/24 and to complete the genetic questionnaire today if not done.

## 2024-09-06 ENCOUNTER — OFFICE VISIT (OUTPATIENT)
Dept: HEMATOLOGY/ONCOLOGY | Facility: CLINIC | Age: 44
End: 2024-09-06

## 2024-09-06 DIAGNOSIS — Z71.83 ENCOUNTER FOR NONPROCREATIVE GENETIC COUNSELING: Primary | ICD-10-CM

## 2024-09-06 DIAGNOSIS — C64.9 CLEAR CELL PAPILLARY RENAL CELL CARCINOMA: ICD-10-CM

## 2024-09-06 PROCEDURE — 99205 OFFICE O/P NEW HI 60 MIN: CPT | Mod: 95,,, | Performed by: PHYSICIAN ASSISTANT

## 2024-09-06 NOTE — PROGRESS NOTES
Hereditary/High Risk Clinic  Department of Hematology and Oncology  [facility]    Cancer Genetics  Initial Consultation    Date of Service:  24  Visit Provider:  Amy Yan PA-C  Collaborating Physician:  Ruby Heath MD    Patient:  Yasir Coe Jr.  :  1980  MRN:   [id number]     Referring Provider    Giancarlo Garnica MD  [address] [place] [address]    Reason For Consult: Hereditary cancer risk assessment    Televisit Information:  Patient location: [place], [place], [place]  Visit type: Audiovisual  Face-to-face time with patient: approximately 40 minutes.  Approximately 65 minutes in total were spent on this encounter, which includes face-to-face time and non-face-to-face time preparing to see the patient (e.g., review of tests), obtaining and/or reviewing separately obtained history, documenting clinical information in the electronic or other health record, independently interpreting results (not separately reported) and communicating results to the patient/family/caregiver, or care coordination (not separately reported).  Each patient to whom he or she provides medical services by telemedicine is:  (1) informed of the relationship between the physician and patient and the respective role of any other health care provider with respect to management of the patient; and (2) notified that he or she may decline to receive medical services by telemedicine and may withdraw from such care at any time.    ASSESSMENT   Yasir Coe Jr. was referred to Cancer Genetics due to his personal history of cancer.     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 The recommendation is to proceed with germline testing to include the genes commonly associated with hereditary RCC. Yasir was given the option of proceeding with testing now, deferring testing to a later date, or declining testing and opted to proceed.     PLAN     1. Encounter for nonprocreative genetic counseling    2. Clear cell papillary renal cell carcinoma  - Ambulatory referral/consult to Genetics          - Proceed with germline genetic testing as noted below.   - Germline genetic testing is not indicated.   - Cancer risk-reduction strategies  - No follow-ups on file.    Genetics lab: [facility]   [id number]    Genetic test: [facility] +RNAinsight   Indication:  Dx RCC at/before age 46   Specimen type: Blood   Specimen collection by: [facility]   Specimen collection date: Future   Results expected by: Approximately 3 weeks after the genetic testing lab receives the specimen   Results disclosure plan: Notification by genetics team and post-test visit    Verbal informed consent: Obtained       Questions were encouraged and answered to the patient's satisfaction, and he verbalized understanding of information and agreement with the plan.       SUBJECTIVE   HPI:  Yasir Coe Jr. is a 44 y.o. assigned male at birth who is new to the [facility] Department of Hematology and Oncology and to me.  He presents for a hereditary cancer risk assessment.      Cancer Screening:   Colonoscopy: None  PSA: 0.47 in 6/2024    Social History     Tobacco Use    Smoking status: Never    Smokeless tobacco: Never   Substance Use Topics    Alcohol use: Yes     Alcohol/week: 3.0 - 6.0 standard drinks of alcohol     Types: 3 - 6 Shots of liquor per week     Family History  No Ashkenazi Judaism ancestry or consanguinity.          A larger copy of the pedigree is available in Media.     Past Medical History:   Diagnosis Date     Anxiety     Clear cell papillary renal cell carcinoma     GERD (gastroesophageal reflux disease)     improved with dietary changes     Patient Active Problem List   Diagnosis    Left renal mass    Mild intermittent asthma without complication    SHIRA (obstructive sleep apnea)    Anxiety    Current use of testosterone replacement therapy    Clear cell papillary renal cell carcinoma     Review of Systems  See HPI.      OBJECTIVE   Physical Exam  Limited secondary to the inherent nature of a virtual visit.  Very pleasant patient.  Constitutional: No apparent distress.   Neurological: Alert and oriented.   Psychiatric: Normal mood, affect, thought content, speech, behavior, judgment.    COUNSELING     CANCER GENETICS    Cancer is caused by an accumulation of genetic mutations that allow cells to grow and divide uncontrollably to form a tumor. Individuals with certain risk factors are more likely to develop genetic mutations that lead to cancer.      Age: Advancing age is the most important risk factor for cancer overall and for many individual cancer types.    Alcohol: Drinking alcohol can increase your risk of cancer of the mouth, throat, esophagus, larynx, liver, and breast.    Smoking: Tobacco contains benzene and other chemicals and can cause cancer of the lung, larynx, mouth, esophagus, throat, bladder, kidney, liver, stomach, pancreas, colon and rectum, and cervix, as well as acute myeloid leukemia.    Chemicals: Asbestos, benzene, vinyl chloride, pesticides, fertilizer, wood dust, radon, aflatoxin (a food contaminant), arsenic (a drinking water contaminant), etc.  Radiation: Ultraviolet (sun, tanning beds) and ionizing radiation (radiation therapy, xrays, CT scans).  Chronic inflammation: Chronic infections (Shaista-Barr virus, HPV, hepatitis B and C, H. Pylori), abnormal immune reactions, and conditions like obesity and inflammatory bowel disease can cause DNA damage and cancer.   Hormones: Combined hormone  therapy (estrogen and progestin) increases the risk for breast cancer. Hormone therapy with estrogen alone increases the risk for endometrial cancer. Diethylstilbestrol (RIO) is a form of estrogen given to some pregnant women from [id number] to prevent premature labor, miscarriages, and other problems. Women who took RIO have an increased risk for breast cancers and their daughters have an increased risk for vaginal and cervical cancer.   Medical conditions: Fatty liver disease, cirrhosis, type 2 diabetes, metabolic syndrome (obesity, high blood pressure, high cholesterol, insuline resistance).   Immunosuppression: Due to condition (HIV/AIDS) or taking immunosuppressive medications (after organ transplant, etc).     While most individuals have a family history of cancer, only 5-10% of cancers are hereditary, meaning they are caused by inherited genetic mutations. The remaining cancers are sporadic, meaning they are caused by mutations acquired during the individual's life as a result of aging, environmental exposures, and other causes.     Sporadic cancer (75-80%): Sporadic cancers are random occurrences caused by an accumulation of genetic mutations acquired during the individual's lifetime.   Hereditary cancer (5-10%): Hereditary cancers are caused by a combination of acquired mutations and an inherited mutation in a single gene. A family with a hereditary cancer syndrome will typically have individuals in every generation with the cancers caused by that gene, often diagnosed 10-20 years younger than usual.   Familial cancer (20%): Familial cancer refers to a clustering a cancer in a family that may be more than you would expect to see by chance, but they do not have an inherited mutation in a single gene that caused the cancers. Instead, their cancers are caused by a combination of shared genetic, environmental, and lifestyle factors.      GERMLINE GENETIC TESTING    Purpose:  Analyze specific genes for inherited  mutations.     Results: Positive, negative, and uncertain. A positive result means a cancer-causing mutation was detected. A negative result means no cancer-causing mutations were detected. Uncertain means a genetic change was detected but it is not known if that change results in an increased risk for cancer.     Benefits: When an individual is aware that they have a germline mutation that increases their risk for certain cancers, they can engage in the recommended screening and risk-reduction strategies.     Risks: Genetic discrimination occurs when any entity uses an individual's genetic information to make a decision that negatively impacts them. Examples would include an insurance refusing to issue a policy or an employer refusing to hire someone because they have a germline genetic mutation. The Genetic Information Nondiscrimination Act of 2008 (NOHELIA) is a federal law that protects healthcare insurance and some employment. This law does not protect elective insurance such as life insurance, long- and short-term disability insurance, long-term care insurance, and cancer policies.     Cost: Many insurance policies cover genetic testing. With [facility] and [facility], most individuals with insurance pay $0-100 out of pocket for testing with a max of $250. The cash price for testing is $250. Both labs offer financial assistance.     REFERENCES   National Comprehensive Cancer Network (NCCN). (2024). Kidney cancer. NCCN Clinical Practice Guidelines in Oncology (NCCN Guidelines), Version 3.2024.    CARLA Pedroza, PADarwinC  Physician Assistant, Hereditary/High Risk Clinic  Hematology/Oncology, [facility]

## 2024-09-11 PROBLEM — C64.9 PAPILLARY RENAL CELL CARCINOMA: Status: ACTIVE | Noted: 2024-09-11

## 2025-02-11 ENCOUNTER — HOSPITAL ENCOUNTER (OUTPATIENT)
Dept: RADIOLOGY | Facility: HOSPITAL | Age: 45
Discharge: HOME | End: 2025-02-11
Attending: STUDENT IN AN ORGANIZED HEALTH CARE EDUCATION/TRAINING PROGRAM
Payer: COMMERCIAL

## 2025-02-11 DIAGNOSIS — C64.9 PAPILLARY RENAL CELL CARCINOMA: ICD-10-CM

## 2025-02-11 PROCEDURE — 25500020 PHARM REV CODE 255: Performed by: STUDENT IN AN ORGANIZED HEALTH CARE EDUCATION/TRAINING PROGRAM

## 2025-02-11 PROCEDURE — 74178 CT ABD&PLV WO CNTR FLWD CNTR: CPT | Mod: TC

## 2025-02-11 RX ADMIN — IOHEXOL 100 ML: 350 INJECTION, SOLUTION INTRAVENOUS at 08:02

## 2025-02-13 ENCOUNTER — OFFICE VISIT (OUTPATIENT)
Dept: UROLOGY | Facility: CLINIC | Age: 45
End: 2025-02-13
Payer: COMMERCIAL

## 2025-02-13 DIAGNOSIS — C64.9 PAPILLARY RENAL CELL CARCINOMA: ICD-10-CM

## 2025-02-13 DIAGNOSIS — N28.89 LEFT RENAL MASS: Primary | ICD-10-CM

## 2025-02-13 PROCEDURE — 98006 SYNCH AUDIO-VIDEO EST MOD 30: CPT | Mod: 95,,, | Performed by: STUDENT IN AN ORGANIZED HEALTH CARE EDUCATION/TRAINING PROGRAM

## 2025-02-17 ENCOUNTER — PATIENT MESSAGE (OUTPATIENT)
Dept: HEMATOLOGY/ONCOLOGY | Facility: CLINIC | Age: 45
End: 2025-02-17
Payer: COMMERCIAL

## 2025-03-19 ENCOUNTER — TELEPHONE (OUTPATIENT)
Dept: HEMATOLOGY/ONCOLOGY | Facility: CLINIC | Age: 45
End: 2025-03-19
Payer: COMMERCIAL

## 2025-03-19 NOTE — TELEPHONE ENCOUNTER
Called and spoke to patient to schedule genetic labs as order was in the in basket the patient stated a mobile phlebotomy can to him to draw labs this week.

## 2025-04-13 NOTE — PROGRESS NOTES
[facility]  Urologic Oncology Clinic Note      Telehealth visit  Visit type: audio + visual   Patient location : Home      Patient was identified by name and birth date. The patient agreed to proceed with a telehealth visit. The visit was performed by audio and video communication.       Date of Service: 2/13/2025        Chief Complaint/Reason for Consultation: Left Renal Mass , Right Renal Cyst    Requesting Provider:   No referring provider defined for this encounter.      History of Present Illness:   Patient 44 y.o. male presents with incidental finding of left renal mass that is enhancing and concerning for malignancy on CT scan performed for epigastric pain.     He has no family hx of kidney cancer.    Takes testosterone once a week  Xanax    Otherwise healthy, active, no constitutional complaints. No hematuria.     Blood thinners:  None    No Hx of  TIA, CVA, MI    Abdominal surgeries:  None      Completed left robotic partial nephrectomy on 7/3/2024 that was completed without complication. He was sent home with ESTEFANY drain in place due to elevated Cr from drain at time of discharge. He had drain removed one week later.     Today back with 6 month f.u w/ axial imaging.  Doing well.        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  2/11/2025 -- CT AP w/ and w/o -- no obvious recurrence       Review of patient's allergies indicates:  No Known Allergies     Past Medical History:   Diagnosis Date    Anxiety     Clear cell papillary renal cell carcinoma 05/2024    GERD (gastroesophageal reflux disease)     improved with dietary changes      Past Surgical History:   Procedure Laterality Date    DV5 ROBOTIC NEPHRECTOMY Left 7/3/2024    Procedure: DV5 ROBOTIC  NEPHRECTOMY PARTIAL;  Surgeon: Giancarlo Garnica MD;  Location: [facility] OR [address] [place] [address];  Service: Urology;  Laterality: Left;  type/screen, B&K Ultrasound, Surgery clearance    FRACTURE SURGERY      bilateral arm      Family History   Problem Relation Name Age of Onset    No Known Problems Mother      Heart disease Father Yasir Gordon     No Known Problems Sister Karmen     Heart attack Maternal Uncle Jarred 66      Social History     Tobacco Use    Smoking status: Never    Smokeless tobacco: Never   Substance Use Topics    Alcohol use: Yes     Alcohol/week: 3.0 - 6.0 standard drinks of alcohol     Types: 3 - 6 Shots of liquor per week        Review of Systems   Constitutional:  Negative for activity change, fatigue and fever.   HENT:  Negative for congestion.    Respiratory:  Negative for cough.    Cardiovascular:  Negative for chest pain.   Genitourinary:  Negative for hematuria.             OBJECTIVE:     No relevant imaging to review        LAB:      All laboratory data listed below was independently interpreted and reviewed with patient in clinic today      CMP  Sodium   Date Value Ref Range Status   07/05/2024 138 136 - 145 mmol/L Final     Potassium   Date Value Ref Range Status   07/05/2024 4.4 3.5 - 5.1 mmol/L Final     Chloride   Date Value Ref Range Status   07/05/2024 108 95 - 110 mmol/L Final     CO2   Date Value Ref Range Status   07/05/2024 24 23 - 29 mmol/L Final     Glucose   Date Value Ref Range Status   07/05/2024 99 70 - 110 mg/dL Final     BUN   Date Value Ref Range Status   07/05/2024 8 6 - 20 mg/dL Final     Creatinine   Date Value Ref Range Status   07/05/2024 0.9 0.5 - 1.4 mg/dL Final     Calcium   Date Value Ref Range Status   07/05/2024 8.8 8.7 - 10.5 mg/dL Final     Total Protein   Date Value Ref Range Status   05/11/2024 7.4 6.0 - 8.4 g/dL Final     Albumin   Date Value Ref Range Status   05/11/2024 3.6 3.5 - 5.2 g/dL Final     Total Bilirubin   Date Value Ref Range Status   05/11/2024 0.3 0.1 - 1.0  mg/dL Final     Comment:     For infants and newborns, interpretation of results should be based  on gestational age, weight and in agreement with clinical  observations.    Premature Infant recommended reference ranges:  Up to 24 hours.............<8.0 mg/dL  Up to 48 hours............<12.0 mg/dL  3-5 days..................<15.0 mg/dL  6-29 days.................<15.0 mg/dL    For patients on Eltrombopag therapy, use of Dimension [place] TBIL is   not   recommended.       Alkaline Phosphatase   Date Value Ref Range Status   05/11/2024 54 (L) 55 - 135 U/L Final     AST   Date Value Ref Range Status   05/11/2024 24 10 - 40 U/L Final     ALT   Date Value Ref Range Status   05/11/2024 28 10 - 44 U/L Final     Anion Gap   Date Value Ref Range Status   07/05/2024 6 (L) 8 - 16 mmol/L Final     eGFR   Date Value Ref Range Status   07/05/2024 >60.0 >60 mL/min/1.73 m^2 Final     Lab Results   Component Value Date    WBC 8.92 07/05/2024    HGB 14.2 07/05/2024    HCT 44.7 07/05/2024    MCV 95 07/05/2024     07/05/2024           IMAGING:      All imaging data listed below was independently interpreted and reviewed with patient in clinic today    2/11/2025  CT ABDOMEN PELVIS W WO CONTRAST     CLINICAL HISTORY:  Malignant neoplasm of unspecified kidney, except renal pelvishx of papilary renal cell carcinoma;     TECHNIQUE:  CT abdomen and pelvis with and without multiphase IV enhancement.  Iterative reconstruction was utilized.  Coronal reformats prepared.     COMPARISON:  05/11/2024     FINDINGS:  Interval partial left nephrectomy with tumor resection.  No evidence of local tumor recurrence.  This study can serve as the baseline postoperative study.     Unchanged right renal cyst.  No enlarged lymph nodes, ascites, or other detrimental imaging change since prior.  Unremarkable liver, adrenals, spleen and pancreas.  Descending-sigmoid diverticula.  No acute osseous abnormalities.     Impression:     Interval partial left  nephrectomy/tumor resection since 05/11/2024     No evidence of local recurrence or metastatic disease        Electronically signed by:Dara Pfeiffer MD  Date:                                            02/11/2025  Time:                                           12:47      ASSESSMENT/PLAN:     45 yo M with hx of papillary renal rosalie carcinoma here in follow up after recent robotic left partial nephrectomy    Plan:    Papillary Renal Rosalie Carcinoma, Left Kidney  - Discussed final pathology, and that surgery deemed curative at this point  - Discussed NCCN guidelines for surveillance  - Discussed surveillance imaging and independently interpreted scans and agree with findings  - CT AP w/ and w.o contrast in 6 months  - CXR yearly   - Will send patient to genetics for evaluation      Giancarlo Garnica MD  Urologic Oncology  P: [phone]

## 2025-04-15 ENCOUNTER — TELEPHONE (OUTPATIENT)
Dept: UROLOGY | Facility: CLINIC | Age: 45
End: 2025-04-15
Payer: COMMERCIAL

## 2025-04-16 ENCOUNTER — DOCUMENTATION ONLY (OUTPATIENT)
Dept: HEMATOLOGY/ONCOLOGY | Facility: CLINIC | Age: 45
End: 2025-04-16
Payer: COMMERCIAL

## 2025-04-16 ENCOUNTER — PATIENT MESSAGE (OUTPATIENT)
Dept: HEMATOLOGY/ONCOLOGY | Facility: CLINIC | Age: 45
End: 2025-04-16
Payer: COMMERCIAL

## 2025-04-16 DIAGNOSIS — C64.9 CLEAR CELL PAPILLARY RENAL CELL CARCINOMA: Primary | ICD-10-CM

## 2025-04-16 NOTE — PROGRESS NOTES
Hereditary/High Risk Clinic  Department of Hematology and Oncology  [facility]    Cancer Genetics  Results    Patient:  Yasir Coe Jr.  :  1980  MRN:  [id number]     Referring Provider:   Giancarlo Garnica MD  [address] [place] [address]     GENETIC TESTING RESULTS        A copy of the results report is available in [facility].     Result: Negative    Genes Analyzed (76 total): AIP, ALK, APC, MAHIN, BAP1, BARD1, BMPR1A, BRCA1, BRCA2, BRIP1, CDC73, CDH1, CDK4, CDKN1B, CDKN2A, CEBPA, CHEK2, DICER1, ETV6, FH, FLCN, GATA2, LZTR1, MAX, MEN1, MET, MLH1, MSH2, MSH6, MUTYH, NF1, NF2, NTHL1, PALB2, PHOX2B, PMS2, POT1, QKAQA5S, PTCH1, PTEN, RAD51C, RAD51D, RB1, RET, RUNX1, SDHA, SDHAF2, SDHB, SDHC, SDHD, SMAD4, SMARCA4, SMARCB1, SMARCE1, STK11, SUFU, SLGY367, TP53, TSC1, TSC2, VHL and WT1 (sequencing and deletion/duplication); AXIN2, CTNNA1, DDX41, EGFR, HOXB13, KIT, MBD4, MITF, MSH3, PDGFRA, POLD1 and POLE (sequencing only); EPCAM and GREM1 (deletion/duplication only). RNA data is routinely analyzed for use in variant interpretation for all genes.     DISCUSSION   Yasir Coe Jr.'s personal/family history is significant for:   Juan: Papillary RCC, single focus detected on imaging for abdominal pain (2024, age 43) s/p right partial nephrectomy (2024, age 44)   No known family history of cancer, polyposis, advanced polyps    Juan underwent comprehensive genetic testing that included genes associated with hereditary renal cell carcinoma. Testing was negative for pathogenic (cancer-causing) mutations in all genes that were tested. In regards to his personal history of cancer, this suggests his cancer was a sporadic occurrence.     This negative result does not completely eliminate the possibility of a hereditary cancer syndrome. Juan could have a mutation in a gene that wasn't included on this panel or is not detectable using current testing technology. Though  it is unlikely, given the lack of family history.     Recommendations:   Follow up with Dr. Garnica 8/11/2025.   Juan is at average risk for prostate cancer. Continue annual PSA. Last was done in 6/2024 and was normal at 0.47.   We are not aware of any colon cancer, polyposis, or advanced polyps in Juan's family, so he would be considered to be at average risk for colon cancer and is due to begin colon cancer screening this year at age 45. The new guidelines state that individuals who are at average risk can do stool studies like Cologuard instead of a colonoscopy. I usually recommend people consider doing a baseline colonoscopy and if there are no polyps, they can then do Cologuard. I advised Juan that if he has insurance coverage, he should set up a colonoscopy after he turns 45. If he doesn't, he can do Cologuard and do a colonoscopy later when he gets insurance coverage.   Cancer risk-reduction strategies.       CARLA Pedroza, PA-C  Physician Assistant, Hereditary/High Risk Clinic  Hematology/Oncology, [facility]

## 2025-08-06 ENCOUNTER — HOSPITAL ENCOUNTER (OUTPATIENT)
Dept: RADIOLOGY | Facility: HOSPITAL | Age: 45
Discharge: HOME | End: 2025-08-06
Attending: STUDENT IN AN ORGANIZED HEALTH CARE EDUCATION/TRAINING PROGRAM

## 2025-08-06 DIAGNOSIS — N28.89 LEFT RENAL MASS: ICD-10-CM

## 2025-08-06 PROCEDURE — 25500020 PHARM REV CODE 255: Performed by: STUDENT IN AN ORGANIZED HEALTH CARE EDUCATION/TRAINING PROGRAM

## 2025-08-06 PROCEDURE — 74177 CT ABD & PELVIS W/CONTRAST: CPT | Mod: TC

## 2025-08-06 PROCEDURE — 71260 CT THORAX DX C+: CPT | Mod: TC

## 2025-08-06 RX ADMIN — IOHEXOL 100 ML: 350 INJECTION, SOLUTION INTRAVENOUS at 08:08

## 2025-08-11 ENCOUNTER — OFFICE VISIT (OUTPATIENT)
Dept: UROLOGY | Facility: CLINIC | Age: 45
End: 2025-08-11
Payer: COMMERCIAL

## 2025-08-11 DIAGNOSIS — C64.9 PAPILLARY RENAL CELL CARCINOMA: Primary | ICD-10-CM

## 2025-08-11 DIAGNOSIS — N28.1 RENAL CYST, RIGHT: ICD-10-CM

## 2025-08-11 PROCEDURE — G2211 COMPLEX E/M VISIT ADD ON: HCPCS | Mod: 95,,, | Performed by: STUDENT IN AN ORGANIZED HEALTH CARE EDUCATION/TRAINING PROGRAM

## 2025-08-11 PROCEDURE — 98006 SYNCH AUDIO-VIDEO EST MOD 30: CPT | Mod: 95,,, | Performed by: STUDENT IN AN ORGANIZED HEALTH CARE EDUCATION/TRAINING PROGRAM

## (undated) DEVICE — NDL INSUF ULTRA VERESS 120MM

## (undated) DEVICE — BLADE SURG CARBON STEEL SZ11

## (undated) DEVICE — SOL NS 1000CC

## (undated) DEVICE — SUT 0 VICRYL / UR6 (J603)

## (undated) DEVICE — DRAIN CHANNEL ROUND 15FR

## (undated) DEVICE — KIT ANTIFOG W/SPONG & FLUID

## (undated) DEVICE — BAG INZII TISS RETRV 12/15MM

## (undated) DEVICE — TRAY CATH 1-LYR URIMTR 16FR

## (undated) DEVICE — TRAY MINOR GEN SURG OMC

## (undated) DEVICE — PORT AIRSEAL 12/120MM LPI

## (undated) DEVICE — SEAL UNIVERSAL 5MM-8MM XI

## (undated) DEVICE — DRAPE ABDOMINAL TIBURON 14X11

## (undated) DEVICE — SCISSOR 5MMX35CM DIRECT DRIVE

## (undated) DEVICE — ELECTRODE REM PLYHSV RETURN 9

## (undated) DEVICE — COVER TIP CURVED SCISSORS XI

## (undated) DEVICE — IRRIGATOR ENDOSCOPY DISP.

## (undated) DEVICE — SUT MCRYL PLUS 4-0 PS2 27IN

## (undated) DEVICE — SET TRI-LUMEN FILTERED TUBE

## (undated) DEVICE — ADHESIVE DERMABOND ADVANCED

## (undated) DEVICE — SOL ELECTROLUBE ANTI-STIC

## (undated) DEVICE — GOWN NONREINF SET-IN SLV 2XL

## (undated) DEVICE — TAPE SILK 3IN

## (undated) DEVICE — GOWN SURGICAL X-LARGE

## (undated) DEVICE — DRAPE SCOPE PILLOW WARMER

## (undated) DEVICE — COVER LIGHT HANDLE

## (undated) DEVICE — DRAPE COLUMN DAVINCI XI

## (undated) DEVICE — DRAPE ARM DAVINCI XI